# Patient Record
Sex: FEMALE | Race: WHITE | NOT HISPANIC OR LATINO | URBAN - METROPOLITAN AREA
[De-identification: names, ages, dates, MRNs, and addresses within clinical notes are randomized per-mention and may not be internally consistent; named-entity substitution may affect disease eponyms.]

---

## 2017-04-04 ENCOUNTER — ALLSCRIPTS OFFICE VISIT (OUTPATIENT)
Dept: OTHER | Facility: OTHER | Age: 47
End: 2017-04-04

## 2017-04-04 DIAGNOSIS — Z12.31 ENCOUNTER FOR SCREENING MAMMOGRAM FOR MALIGNANT NEOPLASM OF BREAST: ICD-10-CM

## 2018-01-12 VITALS
DIASTOLIC BLOOD PRESSURE: 64 MMHG | HEIGHT: 64 IN | SYSTOLIC BLOOD PRESSURE: 116 MMHG | BODY MASS INDEX: 25.44 KG/M2 | WEIGHT: 149 LBS

## 2022-04-14 ENCOUNTER — TELEPHONE (OUTPATIENT)
Dept: GASTROENTEROLOGY | Facility: CLINIC | Age: 52
End: 2022-04-14

## 2022-06-06 NOTE — H&P (VIEW-ONLY)
Assessment:  1  Chronic right-sided low back pain without sciatica    2  Chronic right sacroiliac pain        Plan:  Orders Placed This Encounter   Procedures    X-ray lumbar spine 2 or 3 views     Standing Status:   Future     Standing Expiration Date:   6/10/2026     Scheduling Instructions:      Bring along any outside films relating to this procedure  Order Specific Question:   Is the patient pregnant? Answer:   Unknown       New Medications Ordered This Visit   Medications    tiZANidine (ZANAFLEX) 4 mg tablet     Sig: Take 4 mg by mouth daily at bedtime    ELDERBERRY PO     Sig: Take by mouth       My impressions and treatment recommendations were discussed in detail with the patient, who verbalized understanding and had no further questions  This is a 60-year-old female who presents to the office she complained of right-sided low back pain radiating into the right buttock and right upper leg  She has positive facet loading to the right  Has also positive KRYSTIAN test on the right  Appears she may be suffering from axial low back pain secondary to facet and possibly right SI pain  We will order x-ray of lumbar spine to assess for a facet disease  She may be candidate for MBB/RFA and radiofrequency ablation which was thoroughly discussed with the patient today  We will determine next course of action based on x-ray  Future procedure considerations include SI joint injection  She also can utilize ibuprofen 600 mg t i d  p r n  with food and water  [place] Prescription Drug Monitoring Program report was reviewed and was appropriate     Complete risks and benefits including bleeding, infection, tissue reaction, nerve injury and allergic reaction were discussed  The approach was demonstrated using models and literature was provided  Verbal and written consent was obtained  Discharge instructions were provided   I personally saw and examined the patient and I agree with the above discussed plan of care  History of Present Illness:    Crispin Ambrose is a 46 y o  female who presents to [facility] for initial evaluation of the above stated pain complaints  The patient has a past medical and chronic pain history as outlined in the assessment section  Patient has chief complaint of low back and right hip pain  This is chronic issue for last 5 years  Undetermined cause  Moderate-to-severe intensity over the past month  Eight hundred ten and notable with sitting  Constant, worse in the evening  Described as shooting, sharp, pressure-like, throbbing in nature  Assistive device  Increased with lying down, bending, sitting  No change with standing, walking, exercise, relaxation, coughing, sneezing  No relief with PT primarily with heat/ice therapy and chiropractic manipulation  Pain is primarily right side low back and into the right buttock  Reports having MRI  but doesn't appear to be at [facility]  Reports being told she was "bone on bone" in the lumabr spine       Review of Systems:    Review of Systems      There is no problem list on file for this patient  Past Medical History:   Diagnosis Date    Allergic rhinitis     Complete spontaneous      RESOLVED:     Depression     Insomnia     Postpartum depression        History reviewed  No pertinent surgical history      Family History   Problem Relation Age of Onset    Diabetes Mother     Heart disease Mother     Cervical cancer Maternal Grandmother     Ovarian cancer Maternal Grandmother     Asthma Maternal Grandfather     Diabetes Maternal Grandfather     Heart disease Maternal Grandfather     Heart disease Paternal Grandmother     Insomnia Family     Ovarian cancer Maternal Aunt        Social History     Occupational History    Not on file   Tobacco Use    Smoking status: Never Smoker    Smokeless tobacco: Never Used   Vaping Use    Vaping Use: Never used Substance and Sexual Activity    Alcohol use: Yes     Comment: SOCIAL    Drug use: Never    Sexual activity: Not on file     Comment: ORAL CONTRACEPTIVE USE         Current Outpatient Medications:     ELDERBERRY PO, Take by mouth, Disp: , Rfl:     Multiple Vitamin (MULTI-VITAMIN DAILY PO), Take 1 tablet by mouth daily, Disp: , Rfl:     tiZANidine (ZANAFLEX) 4 mg tablet, Take 4 mg by mouth daily at bedtime, Disp: , Rfl:     ALPRAZolam (XANAX) 0 25 mg tablet, Take 0 25 mg by mouth 3 (three) times a day (Patient not taking: Reported on 6/10/2022), Disp: , Rfl: 1    omeprazole (PriLOSEC) 40 MG capsule, Take 1 tablet by mouth daily (Patient not taking: Reported on 6/10/2022), Disp: , Rfl: 2    Allergies   Allergen Reactions    Other      Environmental    Penicillins Rash       Physical Exam:    /69 (BP Location: Right arm, Patient Position: Sitting, Cuff Size: Standard)   Pulse 71   Wt 71 8 kg (158 lb 6 4 oz)   BMI 27 19 kg/m²     Constitutional: normal, well developed, well nourished, alert, in no distress and non-toxic and no overt pain behavior    Eyes: anicteric  HEENT: grossly intact  Neck: supple, symmetric, trachea midline and no masses   Pulmonary:even and unlabored  Cardiovascular:No edema or pitting edema present  Skin:Normal without rashes or lesions and well hydrated  Psychiatric:Mood and affect appropriate  Neurologic:Cranial Nerves II-XII grossly intact  Musculoskeletal:normal     Lumbar Spine Exam    Appearance:  Normal lordosis  Palpation/Tenderness:  right lumbar paraspinal tenderness  right sacroiliac joint tenderness  Sensory:  no sensory deficits noted  Range of Motion:  Flexion:  Minimally limited  with pain  Extension:  Moderately limited  with pain  Lateral Flexion - Left:  No limitation  without pain  Lateral Flexion - Right:  Minimally limited  with pain  Rotation - Left:  No limitation  without pain  Rotation - Right:  No limitation  without pain  Motor Strength:  Left hip flexion:  5/5  Left hip extension:  5/5  Right hip flexion:  5/5  Right hip extension:  5/5  Left knee flexion:  5/5  Left knee extension:  5/5  Right knee flexion:  5/5  Right knee extension:  5/5  Left foot dorsiflexion:  5/5  Left foot plantar flexion:  5/5  Right foot dorsiflexion:  5/5  Right foot plantar flexion:  5/5  Reflexes:  Left Patellar:  2+   Right Patellar:  2+   Left Achilles:  2+   Right Achilles:  2+   Special Tests:  Left Straight Leg Test:  negative  Right Straight Leg Test:  negative  Left John's Maneuver:  negative  Right John's Maneuver:  positive  Left Gaenslen's Test:  negative  Right Gaenslen's Test:  positive      Lumbar facet loading positive right      Imaging  X-ray lumbar spine 2 or 3 views    (Results Pending)       Orders Placed This Encounter   Procedures    X-ray lumbar spine 2 or 3 views

## 2022-06-06 NOTE — PROGRESS NOTES
Assessment:  1  Chronic right-sided low back pain without sciatica    2  Chronic right sacroiliac pain        Plan:  Orders Placed This Encounter   Procedures    X-ray lumbar spine 2 or 3 views     Standing Status:   Future     Standing Expiration Date:   6/10/2026     Scheduling Instructions:      Bring along any outside films relating to this procedure  Order Specific Question:   Is the patient pregnant? Answer:   Unknown       New Medications Ordered This Visit   Medications    tiZANidine (ZANAFLEX) 4 mg tablet     Sig: Take 4 mg by mouth daily at bedtime    ELDERBERRY PO     Sig: Take by mouth       My impressions and treatment recommendations were discussed in detail with the patient, who verbalized understanding and had no further questions  This is a 51-year-old female who presents to the office she complained of right-sided low back pain radiating into the right buttock and right upper leg  She has positive facet loading to the right  Has also positive KRYSTIAN test on the right  Appears she may be suffering from axial low back pain secondary to facet and possibly right SI pain  We will order x-ray of lumbar spine to assess for a facet disease  She may be candidate for MBB/RFA and radiofrequency ablation which was thoroughly discussed with the patient today  We will determine next course of action based on x-ray  Future procedure considerations include SI joint injection  She also can utilize ibuprofen 600 mg t i d  p r n  with food and water  [place] Prescription Drug Monitoring Program report was reviewed and was appropriate     Complete risks and benefits including bleeding, infection, tissue reaction, nerve injury and allergic reaction were discussed  The approach was demonstrated using models and literature was provided  Verbal and written consent was obtained  Discharge instructions were provided   I personally saw and examined the patient and I agree with the above discussed plan of care  History of Present Illness:    Nghia North is a 46 y o  female who presents to [facility] for initial evaluation of the above stated pain complaints  The patient has a past medical and chronic pain history as outlined in the assessment section  Patient has chief complaint of low back and right hip pain  This is chronic issue for last 5 years  Undetermined cause  Moderate-to-severe intensity over the past month  Eight hundred ten and notable with sitting  Constant, worse in the evening  Described as shooting, sharp, pressure-like, throbbing in nature  Assistive device  Increased with lying down, bending, sitting  No change with standing, walking, exercise, relaxation, coughing, sneezing  No relief with PT primarily with heat/ice therapy and chiropractic manipulation  Pain is primarily right side low back and into the right buttock  Reports having MRI  but doesn't appear to be at [facility]  Reports being told she was "bone on bone" in the lumabr spine       Review of Systems:    Review of Systems      There is no problem list on file for this patient  Past Medical History:   Diagnosis Date    Allergic rhinitis     Complete spontaneous      RESOLVED:     Depression     Insomnia     Postpartum depression        History reviewed  No pertinent surgical history      Family History   Problem Relation Age of Onset    Diabetes Mother     Heart disease Mother     Cervical cancer Maternal Grandmother     Ovarian cancer Maternal Grandmother     Asthma Maternal Grandfather     Diabetes Maternal Grandfather     Heart disease Maternal Grandfather     Heart disease Paternal Grandmother     Insomnia Family     Ovarian cancer Maternal Aunt        Social History     Occupational History    Not on file   Tobacco Use    Smoking status: Never Smoker    Smokeless tobacco: Never Used   Vaping Use    Vaping Use: Never used Substance and Sexual Activity    Alcohol use: Yes     Comment: SOCIAL    Drug use: Never    Sexual activity: Not on file     Comment: ORAL CONTRACEPTIVE USE         Current Outpatient Medications:     ELDERBERRY PO, Take by mouth, Disp: , Rfl:     Multiple Vitamin (MULTI-VITAMIN DAILY PO), Take 1 tablet by mouth daily, Disp: , Rfl:     tiZANidine (ZANAFLEX) 4 mg tablet, Take 4 mg by mouth daily at bedtime, Disp: , Rfl:     ALPRAZolam (XANAX) 0 25 mg tablet, Take 0 25 mg by mouth 3 (three) times a day (Patient not taking: Reported on 6/10/2022), Disp: , Rfl: 1    omeprazole (PriLOSEC) 40 MG capsule, Take 1 tablet by mouth daily (Patient not taking: Reported on 6/10/2022), Disp: , Rfl: 2    Allergies   Allergen Reactions    Other      Environmental    Penicillins Rash       Physical Exam:    /69 (BP Location: Right arm, Patient Position: Sitting, Cuff Size: Standard)   Pulse 71   Wt 71 8 kg (158 lb 6 4 oz)   BMI 27 19 kg/m²     Constitutional: normal, well developed, well nourished, alert, in no distress and non-toxic and no overt pain behavior    Eyes: anicteric  HEENT: grossly intact  Neck: supple, symmetric, trachea midline and no masses   Pulmonary:even and unlabored  Cardiovascular:No edema or pitting edema present  Skin:Normal without rashes or lesions and well hydrated  Psychiatric:Mood and affect appropriate  Neurologic:Cranial Nerves II-XII grossly intact  Musculoskeletal:normal     Lumbar Spine Exam    Appearance:  Normal lordosis  Palpation/Tenderness:  right lumbar paraspinal tenderness  right sacroiliac joint tenderness  Sensory:  no sensory deficits noted  Range of Motion:  Flexion:  Minimally limited  with pain  Extension:  Moderately limited  with pain  Lateral Flexion - Left:  No limitation  without pain  Lateral Flexion - Right:  Minimally limited  with pain  Rotation - Left:  No limitation  without pain  Rotation - Right:  No limitation  without pain  Motor Strength:  Left hip flexion:  5/5  Left hip extension:  5/5  Right hip flexion:  5/5  Right hip extension:  5/5  Left knee flexion:  5/5  Left knee extension:  5/5  Right knee flexion:  5/5  Right knee extension:  5/5  Left foot dorsiflexion:  5/5  Left foot plantar flexion:  5/5  Right foot dorsiflexion:  5/5  Right foot plantar flexion:  5/5  Reflexes:  Left Patellar:  2+   Right Patellar:  2+   Left Achilles:  2+   Right Achilles:  2+   Special Tests:  Left Straight Leg Test:  negative  Right Straight Leg Test:  negative  Left John's Maneuver:  negative  Right John's Maneuver:  positive  Left Gaenslen's Test:  negative  Right Gaenslen's Test:  positive      Lumbar facet loading positive right      Imaging  X-ray lumbar spine 2 or 3 views    (Results Pending)       Orders Placed This Encounter   Procedures    X-ray lumbar spine 2 or 3 views

## 2022-06-10 ENCOUNTER — APPOINTMENT (OUTPATIENT)
Dept: RADIOLOGY | Facility: CLINIC | Age: 52
End: 2022-06-10
Payer: COMMERCIAL

## 2022-06-10 ENCOUNTER — CONSULT (OUTPATIENT)
Dept: PAIN MEDICINE | Facility: CLINIC | Age: 52
End: 2022-06-10
Payer: COMMERCIAL

## 2022-06-10 VITALS
HEART RATE: 71 BPM | WEIGHT: 158.4 LBS | DIASTOLIC BLOOD PRESSURE: 69 MMHG | SYSTOLIC BLOOD PRESSURE: 102 MMHG | BODY MASS INDEX: 27.19 KG/M2

## 2022-06-10 DIAGNOSIS — G89.29 CHRONIC RIGHT-SIDED LOW BACK PAIN WITHOUT SCIATICA: ICD-10-CM

## 2022-06-10 DIAGNOSIS — M54.50 CHRONIC RIGHT-SIDED LOW BACK PAIN WITHOUT SCIATICA: Primary | ICD-10-CM

## 2022-06-10 DIAGNOSIS — M54.50 CHRONIC RIGHT-SIDED LOW BACK PAIN WITHOUT SCIATICA: ICD-10-CM

## 2022-06-10 DIAGNOSIS — G89.29 CHRONIC RIGHT-SIDED LOW BACK PAIN WITHOUT SCIATICA: Primary | ICD-10-CM

## 2022-06-10 DIAGNOSIS — M53.3 CHRONIC RIGHT SACROILIAC PAIN: ICD-10-CM

## 2022-06-10 DIAGNOSIS — G89.29 CHRONIC RIGHT SACROILIAC PAIN: ICD-10-CM

## 2022-06-10 PROCEDURE — 99204 OFFICE O/P NEW MOD 45 MIN: CPT | Performed by: STUDENT IN AN ORGANIZED HEALTH CARE EDUCATION/TRAINING PROGRAM

## 2022-06-10 PROCEDURE — 72100 X-RAY EXAM L-S SPINE 2/3 VWS: CPT

## 2022-06-10 RX ORDER — TIZANIDINE 4 MG/1
4 TABLET ORAL
COMMUNITY
Start: 2022-04-14

## 2022-06-10 RX ORDER — DIPHENOXYLATE HYDROCHLORIDE AND ATROPINE SULFATE 2.5; .025 MG/1; MG/1
1 TABLET ORAL
COMMUNITY
End: 2022-06-10 | Stop reason: SDUPTHER

## 2022-06-10 NOTE — PATIENT INSTRUCTIONS
Lumbar Facet Block   WHAT YOU NEED TO KNOW:   What do I need to know about a lumbar facet block? A lumbar facet block is a procedure used to decrease inflammation in your lower spine  Medicines are injected at facet joints in your lower back  Facet joints are found at the back of each vertebrae  How do I prepare for the procedure? Your healthcare provider will talk to you about how to prepare for your procedure  He or she may tell you not to eat or drink anything after midnight on the day of your procedure  Arrange to have someone drive you home after the procedure  Tell your provider about all the medicines you currently take  He or she will tell you if you need to stop any medicine before the procedure, and when to stop  He or she will tell you what medicines to take or not take on the day of your procedure  Your provider will also talk to you about your regular pain medicines  He or she may want you to wait a certain amount of time after the procedure before you start them again  This will help him or her see if the facet block worked for you  You may need blood or urine tests before your procedure  You may also need x-rays, a CT scan, or an MRI  Tell your healthcare provider if you have ever had an allergic reaction to contrast liquid  Do not enter the MRI room with anything metal  Metal can cause serious damage  Tell the provider if you have any metal in or on your body  What will happen during the procedure? You will lie on your stomach, with your body slightly turned to the side  A pillow may be placed under your abdomen, or you may be asked to bend one or both knees  A needle will be inserted into the facet joint in your lower back  Your surgeon may use contrast liquid with an x-ray or CT to help guide the needle  He or she will inject medicines, such as steroids, to decrease inflammation  What should I expect after the procedure?   You will be taken to a room to rest until you are fully awake  You will be monitored closely for any problems  Do not get out of bed until your healthcare provider says it is okay  Your provider may have you move the area to see if you still have pain  You may then be able to go home  What are the risks of a lumbar facet block? You may bleed more than expected or get an infection  Nerves, blood vessels, or muscles may be damaged  You may have numbness in other areas  You may still have lower back or leg pain  CARE AGREEMENT:   You have the right to help plan your care  Learn about your health condition and how it may be treated  Discuss treatment options with your healthcare providers to decide what care you want to receive  You always have the right to refuse treatment  The above information is an  only  It is not intended as medical advice for individual conditions or treatments  Talk to your doctor, nurse or pharmacist before following any medical regimen to see if it is safe and effective for you  © Copyright [employer] 2022 Information is for End User's use only and may not be sold, redistributed or otherwise used for commercial purposes   All illustrations and images included in CareNotes® are the copyrighted property of A D A M , Inc  or [address] [place] [address] [employer]

## 2022-06-17 ENCOUNTER — OFFICE VISIT (OUTPATIENT)
Dept: OBGYN CLINIC | Facility: MEDICAL CENTER | Age: 52
End: 2022-06-17
Payer: COMMERCIAL

## 2022-06-17 VITALS
DIASTOLIC BLOOD PRESSURE: 86 MMHG | SYSTOLIC BLOOD PRESSURE: 134 MMHG | WEIGHT: 158.6 LBS | BODY MASS INDEX: 27.08 KG/M2 | HEIGHT: 64 IN

## 2022-06-17 DIAGNOSIS — Z12.11 ENCOUNTER FOR SCREENING FOR MALIGNANT NEOPLASM OF COLON: ICD-10-CM

## 2022-06-17 DIAGNOSIS — Z01.419 ENCOUNTER FOR ANNUAL ROUTINE GYNECOLOGICAL EXAMINATION: Primary | ICD-10-CM

## 2022-06-17 DIAGNOSIS — Z12.31 ENCOUNTER FOR SCREENING MAMMOGRAM FOR MALIGNANT NEOPLASM OF BREAST: ICD-10-CM

## 2022-06-17 DIAGNOSIS — Z80.41 FAMILY HISTORY OF OVARIAN CANCER: ICD-10-CM

## 2022-06-17 DIAGNOSIS — Z11.51 SCREENING FOR HUMAN PAPILLOMAVIRUS (HPV): ICD-10-CM

## 2022-06-17 PROCEDURE — G0145 SCR C/V CYTO,THINLAYER,RESCR: HCPCS | Performed by: OBSTETRICS & GYNECOLOGY

## 2022-06-17 PROCEDURE — 99386 PREV VISIT NEW AGE 40-64: CPT | Performed by: OBSTETRICS & GYNECOLOGY

## 2022-06-17 PROCEDURE — G0476 HPV COMBO ASSAY CA SCREEN: HCPCS | Performed by: OBSTETRICS & GYNECOLOGY

## 2022-06-17 NOTE — PROGRESS NOTES
Hood Gerber  1970      CC:  Yearly exam    S:  46 y o  female here for yearly exam   Cycles are irregular  , then 2022 (this was an 8d long bleed), none since then  Perimenopausal   Hot flashes x 1 year - worse in the evenings  She denies vaginal discharge, itching, pelvic pain  She has no urinary concerns, does have occasional incontinence, small volumes  Chronic issues with constipation  No breast concerns  Sexual activity: She is sexually active without pain, bleeding or dryness  Last Pap: 2/17/15 - NILM, neg HPV  Last Mammo:  12/3/15 - negative   GI referral provided     We reviewed ASCCP guidelines for Pap testing today       Family hx of breast cancer: no  Family hx of ovarian cancer: MGM, M Aunt - Stage IV, living  Family hx of colon cancer: no      Current Outpatient Medications:     Ascorbic Acid (VITAMIN C PO), Take by mouth, Disp: , Rfl:     Calcium Carbonate-Vitamin D (CALCIUM-D PO), Take by mouth, Disp: , Rfl:     ELDERBERRY PO, Take by mouth, Disp: , Rfl:     Multiple Vitamin (MULTI-VITAMIN DAILY PO), Take 1 tablet by mouth daily, Disp: , Rfl:     Omega-3 Fatty Acids (OMEGA-3 FISH OIL PO), Take by mouth, Disp: , Rfl:     VITAMIN D PO, Take by mouth, Disp: , Rfl:     tiZANidine (ZANAFLEX) 4 mg tablet, Take 4 mg by mouth daily at bedtime (Patient not taking: Reported on 2022), Disp: , Rfl:   Patient Active Problem List   Diagnosis    Anxiety     Past Medical History:   Diagnosis Date    Allergic rhinitis     Complete spontaneous      RESOLVED:     Depression     Insomnia     Postpartum depression      Family History   Problem Relation Age of Onset    Diabetes Mother     Heart disease Mother     Cervical cancer Maternal Grandmother     Ovarian cancer Maternal Grandmother     Asthma Maternal Grandfather     Diabetes Maternal Grandfather     Heart disease Maternal Grandfather     Heart disease Paternal Grandmother     Insomnia Family  Ovarian cancer Maternal Aunt           Review of Systems   Respiratory: Negative  Cardiovascular: Negative  Gastrointestinal: Negative for constipation and diarrhea  O:  Blood pressure 134/86, height 5' 3 5" (1 613 m), weight 71 9 kg (158 lb 9 6 oz), last menstrual period 04/19/2022  Patient appears well and is not in distress  Breasts are symmetrical without mass, tenderness, nipple discharge, skin changes or adenopathy  Abdomen is soft and nontender without masses  External genitals are normal without lesions or rashes  Urethral meatus and urethra are normal  Bladder is normal to palpation  Vagina is normal without discharge or bleeding  Cervix is normal without discharge or lesion  Uterus is normal, mobile, nontender without palpable mass  Adnexa are normal, nontender, without palpable mass  A:  Yearly exam      P:   Pap & HPV collected   Mammo ordered   Colon Cancer Screening ordered   DIscussed options for genetic screening in light of family history, referral placed for her consideration  Discussed perimenopausal considerations  Aware to call with frequent heavy bleeding, or new onset/return of bleeding after >12 months   RTO one year for yearly exam or sooner as needed

## 2022-06-20 LAB
HPV HR 12 DNA CVX QL NAA+PROBE: NEGATIVE
HPV16 DNA CVX QL NAA+PROBE: NEGATIVE
HPV18 DNA CVX QL NAA+PROBE: NEGATIVE

## 2022-06-22 ENCOUNTER — TELEPHONE (OUTPATIENT)
Dept: PAIN MEDICINE | Facility: MEDICAL CENTER | Age: 52
End: 2022-06-22

## 2022-06-22 NOTE — TELEPHONE ENCOUNTER
Pt called stating she completed her xrays and would like to know  He next steps  Pt can be reached at [phone]

## 2022-06-23 ENCOUNTER — TELEPHONE (OUTPATIENT)
Dept: GENETICS | Facility: CLINIC | Age: 52
End: 2022-06-23

## 2022-06-23 DIAGNOSIS — M47.816 LUMBAR SPONDYLOSIS: Primary | ICD-10-CM

## 2022-06-23 NOTE — TELEPHONE ENCOUNTER
S/W pt and advised of results and plan  Pt's pain is on the right side LB and around hip area  Explained process of MBB/RFA  Pt is agreeable to injections    Please place order    Pt is aware that approval for insurance needed prior to scheduling

## 2022-06-23 NOTE — TELEPHONE ENCOUNTER
I called Nicholas Palm to schedule a new patient appointment with the [facility]       Outcome:   Spoke with pt, appt schedule for feb 22 at 2:30  Fhx of ovarian cancer in MGM and M aunt, Bone Ca - M  Uncle   No prior genetic testing done

## 2022-06-23 NOTE — TELEPHONE ENCOUNTER
Chloe Pabon MD  [facility]  xRAY shows some degenerative disc disease in the lumbar spine where there is narrowing in the disc spaces  The is no fracture or misalignment   She is cadndiate for right L4-5 and L5-S1 mbb #1 for diagnostic purposes, assuming pain is still predominantly right sided

## 2022-06-24 LAB
LAB AP GYN PRIMARY INTERPRETATION: NORMAL
LAB AP LMP: NORMAL
Lab: NORMAL

## 2022-06-27 NOTE — TELEPHONE ENCOUNTER
S/w pt and scheduled MBB for 7/5/22 315 pm arrival  Gave pre procedure instructions and  policy, [facility]

## 2022-07-05 ENCOUNTER — HOSPITAL ENCOUNTER (OUTPATIENT)
Dept: RADIOLOGY | Facility: CLINIC | Age: 52
Discharge: HOME | End: 2022-07-05
Admitting: STUDENT IN AN ORGANIZED HEALTH CARE EDUCATION/TRAINING PROGRAM
Payer: COMMERCIAL

## 2022-07-05 VITALS
OXYGEN SATURATION: 97 % | HEART RATE: 68 BPM | TEMPERATURE: 97.3 F | DIASTOLIC BLOOD PRESSURE: 62 MMHG | SYSTOLIC BLOOD PRESSURE: 102 MMHG | RESPIRATION RATE: 18 BRPM

## 2022-07-05 DIAGNOSIS — M47.816 LUMBAR SPONDYLOSIS: ICD-10-CM

## 2022-07-05 PROCEDURE — 64493 INJ PARAVERT F JNT L/S 1 LEV: CPT | Performed by: STUDENT IN AN ORGANIZED HEALTH CARE EDUCATION/TRAINING PROGRAM

## 2022-07-05 PROCEDURE — 64494 INJ PARAVERT F JNT L/S 2 LEV: CPT | Performed by: STUDENT IN AN ORGANIZED HEALTH CARE EDUCATION/TRAINING PROGRAM

## 2022-07-05 RX ORDER — BUPIVACAINE HCL/PF 2.5 MG/ML
1.5 VIAL (ML) INJECTION ONCE
Status: COMPLETED | OUTPATIENT
Start: 2022-07-05 | End: 2022-07-05

## 2022-07-05 RX ADMIN — Medication 1.5 ML: at 15:40

## 2022-07-05 NOTE — INTERVAL H&P NOTE
Update: (This section must be completed if the H&P was completed greater than 24 hrs to procedure or admission)    H&P reviewed  After examining the patient, I find no changed to the H&P since it had been written  Patient re-evaluated   Accept as history and physical     Nathen Bethea MD/July 5, 2022/3:28 PM

## 2022-07-05 NOTE — DISCHARGE INSTR - LAB

## 2022-07-06 ENCOUNTER — PATIENT MESSAGE (OUTPATIENT)
Dept: PAIN MEDICINE | Facility: CLINIC | Age: 52
End: 2022-07-06

## 2022-07-07 ENCOUNTER — TELEPHONE (OUTPATIENT)
Dept: RADIOLOGY | Facility: CLINIC | Age: 52
End: 2022-07-07

## 2022-07-07 NOTE — TELEPHONE ENCOUNTER
S/p R L4-5 and L5-S1 MBB #1 7/5  Pt reports % relief til the following morning    Per SP protocol, please schedule MBB #2

## 2022-07-07 NOTE — TELEPHONE ENCOUNTER
----- Message from Yumiko Bartlett sent at 7/6/2022  9:17 PM EDT -----  Regarding: MBB diary  Form attached

## 2022-07-21 ENCOUNTER — TELEPHONE (OUTPATIENT)
Dept: PAIN MEDICINE | Facility: CLINIC | Age: 52
End: 2022-07-21

## 2022-07-21 DIAGNOSIS — M47.816 LUMBAR FACET ARTHROPATHY: Primary | ICD-10-CM

## 2022-07-21 NOTE — TELEPHONE ENCOUNTER
Schedule patient for 8/2/22  No as needed pain meds for 6 hrs before and 6/8 hrs after procedure  Pain level must be 5 or greater  Need to arrange transportation  Proper clothing for procedure  If ill or placed on antibiotics please call to reschedule

## 2022-07-21 NOTE — TELEPHONE ENCOUNTER
----- Message from Briseida Acosta RN sent at 7/21/2022  1:09 PM EDT -----  Regarding: FW: MBB diary  John Mazariegos would you be able to look into auth for this procedure?    ----- Message -----  From: Daphne Srinivasan MD  Sent: 7/21/2022  12:51 PM EDT  To: [facility]  Subject: FW: MBB diary                                    Order placed  ----- Message -----  From: Tona Ontiveros RN  Sent: 7/19/2022  10:40 AM EDT  To: Daphne Srinivasan MD  Subject: FW: MBB diary                                    Please place order for MBB#2   ----- Message -----  From: Brenda Durán  Sent: 7/19/2022   9:48 AM EDT  To: [facility]  Subject: MBB diary                                        Hello, I was just checking to see if you heard back from my insurance  My pain has returned       Sincerely,  Rubia

## 2022-07-21 NOTE — PATIENT COMMUNICATION
Left message for the patient to call to schedule her injection    Gave my direct phone number [phone]  Alexi Shy is pending UXJ#[id number], Costa reaves no PA required [id number]

## 2022-07-28 ENCOUNTER — HOSPITAL ENCOUNTER (OUTPATIENT)
Dept: MAMMOGRAPHY | Facility: CLINIC | Age: 52
Discharge: HOME | End: 2022-07-28
Payer: COMMERCIAL

## 2022-07-28 DIAGNOSIS — Z12.31 ENCOUNTER FOR SCREENING MAMMOGRAM FOR MALIGNANT NEOPLASM OF BREAST: ICD-10-CM

## 2022-07-28 PROCEDURE — 77067 SCR MAMMO BI INCL CAD: CPT

## 2022-07-28 PROCEDURE — 77063 BREAST TOMOSYNTHESIS BI: CPT

## 2022-08-02 ENCOUNTER — HOSPITAL ENCOUNTER (OUTPATIENT)
Dept: RADIOLOGY | Facility: CLINIC | Age: 52
Discharge: HOME | End: 2022-08-02
Admitting: STUDENT IN AN ORGANIZED HEALTH CARE EDUCATION/TRAINING PROGRAM
Payer: COMMERCIAL

## 2022-08-02 VITALS
SYSTOLIC BLOOD PRESSURE: 110 MMHG | OXYGEN SATURATION: 98 % | DIASTOLIC BLOOD PRESSURE: 66 MMHG | RESPIRATION RATE: 17 BRPM | TEMPERATURE: 98.6 F | HEART RATE: 69 BPM

## 2022-08-02 DIAGNOSIS — M47.816 LUMBAR FACET ARTHROPATHY: ICD-10-CM

## 2022-08-02 PROCEDURE — 64494 INJ PARAVERT F JNT L/S 2 LEV: CPT | Performed by: STUDENT IN AN ORGANIZED HEALTH CARE EDUCATION/TRAINING PROGRAM

## 2022-08-02 PROCEDURE — 64493 INJ PARAVERT F JNT L/S 1 LEV: CPT | Performed by: STUDENT IN AN ORGANIZED HEALTH CARE EDUCATION/TRAINING PROGRAM

## 2022-08-02 RX ORDER — BUPIVACAINE HYDROCHLORIDE 7.5 MG/ML
3 INJECTION, SOLUTION EPIDURAL; RETROBULBAR ONCE
Status: COMPLETED | OUTPATIENT
Start: 2022-08-02 | End: 2022-08-02

## 2022-08-02 RX ADMIN — BUPIVACAINE HYDROCHLORIDE 1.5 ML: 7.5 INJECTION, SOLUTION EPIDURAL; RETROBULBAR at 15:32

## 2022-08-02 NOTE — INTERVAL H&P NOTE
Update: (This section must be completed if the H&P was completed greater than 24 hrs to procedure or admission)    H&P updated  The patient was examined and patient was noted to have pain only on the right  Therefore procedure performed only on right side  Patient re-evaluated   Accept as history and physical     Charlotte Quijano MD/August 2, 2022/3:19 PM

## 2022-08-02 NOTE — H&P
History of Present Illness: The patient is a 46 y o  female who presents with complaints of bilateral low back pain    Patient Active Problem List   Diagnosis    Anxiety       Past Medical History:   Diagnosis Date    Allergic rhinitis     Complete spontaneous      RESOLVED:     Depression     Insomnia     Postpartum depression        No past surgical history on file  Current Outpatient Medications:     Ascorbic Acid (VITAMIN C PO), Take by mouth, Disp: , Rfl:     Calcium Carbonate-Vitamin D (CALCIUM-D PO), Take by mouth, Disp: , Rfl:     ELDERBERRY PO, Take by mouth, Disp: , Rfl:     Multiple Vitamin (MULTI-VITAMIN DAILY PO), Take 1 tablet by mouth daily, Disp: , Rfl:     Omega-3 Fatty Acids (OMEGA-3 FISH OIL PO), Take by mouth, Disp: , Rfl:     tiZANidine (ZANAFLEX) 4 mg tablet, Take 4 mg by mouth daily at bedtime (Patient not taking: Reported on 2022), Disp: , Rfl:     VITAMIN D PO, Take by mouth, Disp: , Rfl:     Allergies   Allergen Reactions    Other      Environmental    Penicillins Rash       Physical Exam: There were no vitals filed for this visit  General: Awake, Alert, Oriented x 3, Mood and affect appropriate  Respiratory: Respirations even and unlabored  Cardiovascular: Peripheral pulses intact; no edema  Musculoskeletal Exam: low back pain    ASA Score: 3         Assessment:   1   Lumbar facet arthropathy        Plan: B/L L4-5 and L5-S1 MBB #2

## 2022-08-02 NOTE — DISCHARGE INSTR - LAB

## 2022-08-17 DIAGNOSIS — M47.816 LUMBAR FACET ARTHROPATHY: Primary | ICD-10-CM

## 2022-08-24 ENCOUNTER — TELEPHONE (OUTPATIENT)
Dept: PAIN MEDICINE | Facility: CLINIC | Age: 52
End: 2022-08-24

## 2022-08-24 NOTE — TELEPHONE ENCOUNTER
----- Message from Lelia Logan RN sent at 8/15/2022  1:59 PM EDT -----  Regarding: FW: Pain Diary from 8/2/22    ----- Message -----  From: Morgan Shell MD  Sent: 8/15/2022   1:56 PM EDT  To: [facility]  Subject: FW: Pain Diary from 8/2/22                       Ok to schedule  ----- Message -----  From: Maynor Velazquez RN  Sent: 8/11/2022   2:11 PM EDT  To: Morgan Shell MD  Subject: FW: Pain Diary from 8/2/22                       Please see pain diary and advise  Ok to schedule RFA?  ----- Message -----  From: Coco Nye  Sent: 8/11/2022   1:47 PM EDT  To: [facility]  Subject: Pain Diary from 8/2/22                           Hello, my apologizes  I forgot to send

## 2022-08-24 NOTE — TELEPHONE ENCOUNTER
Spoke with Corey Valle to schedule the RFAs  She seem unaware that there were more procedures to complete  After explaining the RFAs, the procedure scared her  Please call to explain and discuss the RFA procedure  They have been approved by her insurance, so they can be scheduled

## 2022-08-24 NOTE — TELEPHONE ENCOUNTER
FYI:  S/W pt and explained procedure, risks, side effects, etc   Also discussed she may need medication to calm her nerves, which could help    Pt states she will think about it and CB to schedule

## 2022-08-25 NOTE — TELEPHONE ENCOUNTER
Spoke with Kaye Lui- she will call the office when she is ready to schedule    Scanned the PAT and auth into her chart

## 2022-08-29 DIAGNOSIS — M54.9 MID BACK PAIN: ICD-10-CM

## 2022-08-29 DIAGNOSIS — M47.816 LUMBAR FACET ARTHROPATHY: Primary | ICD-10-CM

## 2022-08-29 DIAGNOSIS — F41.9 ANXIETY DUE TO INVASIVE PROCEDURE: ICD-10-CM

## 2022-08-29 RX ORDER — LORAZEPAM 1 MG/1
TABLET ORAL
Qty: 1 TABLET | Refills: 0 | Status: SHIPPED | OUTPATIENT
Start: 2022-09-08 | End: 2023-02-10

## 2022-08-29 NOTE — TELEPHONE ENCOUNTER
Schedule patient for Right RFA on 9/8/22  She stated that the last MBB procedure was only done on her right side      Need to arrange transportation  Proper clothing for procedure  If ill or placed on antibiotics please call to reschedule    Please call in an anxiety medication for prior to the RFA

## 2022-09-08 ENCOUNTER — HOSPITAL ENCOUNTER (OUTPATIENT)
Dept: RADIOLOGY | Facility: CLINIC | Age: 52
End: 2022-09-08
Payer: COMMERCIAL

## 2022-09-08 VITALS
SYSTOLIC BLOOD PRESSURE: 122 MMHG | DIASTOLIC BLOOD PRESSURE: 71 MMHG | OXYGEN SATURATION: 96 % | RESPIRATION RATE: 18 BRPM | HEART RATE: 70 BPM | TEMPERATURE: 97.6 F

## 2022-09-08 DIAGNOSIS — M47.816 LUMBAR FACET ARTHROPATHY: ICD-10-CM

## 2022-09-08 PROCEDURE — 64636 DESTROY L/S FACET JNT ADDL: CPT | Performed by: STUDENT IN AN ORGANIZED HEALTH CARE EDUCATION/TRAINING PROGRAM

## 2022-09-08 PROCEDURE — 64635 DESTROY LUMB/SAC FACET JNT: CPT | Performed by: STUDENT IN AN ORGANIZED HEALTH CARE EDUCATION/TRAINING PROGRAM

## 2022-09-08 RX ADMIN — Medication 3 ML: at 13:25

## 2022-09-08 NOTE — H&P
History of Present Illness: The patient is a 46 y o  female who presents with complaints of right-sided low back pain    Patient Active Problem List   Diagnosis    Anxiety       Past Medical History:   Diagnosis Date    Allergic rhinitis     Complete spontaneous      RESOLVED:     Depression     Insomnia     Postpartum depression        No past surgical history on file  Current Outpatient Medications:     Ascorbic Acid (VITAMIN C PO), Take by mouth, Disp: , Rfl:     Calcium Carbonate-Vitamin D (CALCIUM-D PO), Take by mouth, Disp: , Rfl:     ELDERBERRY PO, Take by mouth, Disp: , Rfl:     LORazepam (ATIVAN) 1 mg tablet, Take 1 tablet approximately 45 minutes prior to your procedure , Disp: 1 tablet, Rfl: 0    Multiple Vitamin (MULTI-VITAMIN DAILY PO), Take 1 tablet by mouth daily, Disp: , Rfl:     Omega-3 Fatty Acids (OMEGA-3 FISH OIL PO), Take by mouth, Disp: , Rfl:     tiZANidine (ZANAFLEX) 4 mg tablet, Take 4 mg by mouth daily at bedtime (Patient not taking: Reported on 2022), Disp: , Rfl:     VITAMIN D PO, Take by mouth, Disp: , Rfl:     Current Facility-Administered Medications:     lidocaine (PF) (XYLOCAINE-MPF) 2 % injection 3 mL, 3 mL, Other, Once, Gretta Mariscal MD    Allergies   Allergen Reactions    Other      Environmental    Penicillins Rash       Physical Exam: There were no vitals filed for this visit  General: Awake, Alert, Oriented x 3, Mood and affect appropriate  Respiratory: Respirations even and unlabored  Cardiovascular: Peripheral pulses intact; no edema  Musculoskeletal Exam:  Positive facet loading right    ASA Score: 3    Patient/Chart Verification  Patient ID Verified: Verbal  ID Band Applied: No  Consents Confirmed: To be obtained in the Pre-Procedure area  H&P( within 30 days) Verified: To be obtained in the Pre-Procedure area  Interval H&P(within 24 hr) Complete (required for Outpatients and Surgery Admit only):  To be obtained in the Pre-Procedure area  Allergies Reviewed: Yes  Anticoag/NSAID held?: NA  Currently on antibiotics?: No  Pregnancy denied?: NA    Assessment:   1   Lumbar facet arthropathy        Plan: right L4 L5 S1 RFA

## 2022-09-08 NOTE — DISCHARGE INSTR - LAB
Medial Branch Radiofrequency Ablation     WHAT YOU NEED TO KNOW:   Medial branch radiofrequency ablation (RFA) is a procedure used to treat facet joint pain in your neck, mid back, or lower back  Facet joints are found at the back of each vertebra  A needle electrode is used to send electrical currents to the nerves in your facet joint  The electrical currents create heat that damages the nerve so it cannot send pain signals  ACTIVITY  Do not drive or operate machinery today  No strenuous activity today - bending, lifting, etc   You may shower today, but do not sit in a tub of water  Resume normal activities tomorrow as tolerated  CARE OF THE INJECTION SITE  If you have soreness or pain, apply ice to the area today (20 minutes on/20 minutes off)  Starting tomorrow, you may use warm, moist heat or ice if needed  Notify the [facility] if you have any of the following: redness, drainage, swelling, or fever above 100°F     SPECIAL INSTRUCTIONS  Our office will call you tomorrow for a progress report and make an appointment for a follow up visit in 4 weeks  If you feel a sunburn-like sensation in the area of your procedure, call our office  MEDICATIONS  Continue to take all routine medications  Our office may have instructed you to hold some medications  As no general anesthesia was used in today's procedure, you should not experience any side effects related to anesthesia  If you have a problem specifically related to your procedure, please call our office at [phone]  Problems not related to your procedure should be directed to your primary care physician

## 2022-09-13 ENCOUNTER — TELEPHONE (OUTPATIENT)
Dept: RADIOLOGY | Facility: CLINIC | Age: 52
End: 2022-09-13

## 2022-09-13 NOTE — TELEPHONE ENCOUNTER
S/w pt, pt denies sunburn sensation and sx of infection, little sore but doing okay  Pls call pt to schedule f/u ov

## 2023-01-10 ENCOUNTER — DOCUMENTATION (OUTPATIENT)
Dept: GENETICS | Facility: CLINIC | Age: 53
End: 2023-01-10

## 2023-01-24 ENCOUNTER — HOSPITAL ENCOUNTER (OUTPATIENT)
Dept: ULTRASOUND IMAGING | Facility: HOSPITAL | Age: 53
Discharge: HOME | End: 2023-01-24

## 2023-01-24 DIAGNOSIS — R10.84 GENERALIZED ABDOMINAL PAIN: ICD-10-CM

## 2023-01-27 ENCOUNTER — APPOINTMENT (OUTPATIENT)
Dept: LAB | Facility: MEDICAL CENTER | Age: 53
End: 2023-01-27

## 2023-01-27 DIAGNOSIS — R10.84 ABDOMINAL PAIN, GENERALIZED: ICD-10-CM

## 2023-01-27 LAB
AMYLASE SERPL-CCNC: 35 IU/L (ref 25–115)
LIPASE SERPL-CCNC: 140 U/L (ref 73–393)

## 2023-02-10 ENCOUNTER — TELEPHONE (OUTPATIENT)
Dept: HEMATOLOGY ONCOLOGY | Facility: CLINIC | Age: 53
End: 2023-02-10

## 2023-02-10 ENCOUNTER — HOSPITAL ENCOUNTER (OUTPATIENT)
Dept: NUCLEAR MEDICINE | Facility: HOSPITAL | Age: 53
Discharge: HOME | End: 2023-02-10
Attending: INTERNAL MEDICINE

## 2023-02-10 DIAGNOSIS — R10.84 GENERALIZED ABDOMINAL PAIN: ICD-10-CM

## 2023-02-15 ENCOUNTER — OFFICE VISIT (OUTPATIENT)
Dept: GASTROENTEROLOGY | Facility: CLINIC | Age: 53
End: 2023-02-15

## 2023-02-15 VITALS
WEIGHT: 154 LBS | HEART RATE: 83 BPM | BODY MASS INDEX: 26.29 KG/M2 | DIASTOLIC BLOOD PRESSURE: 68 MMHG | OXYGEN SATURATION: 99 % | SYSTOLIC BLOOD PRESSURE: 108 MMHG | HEIGHT: 64 IN

## 2023-02-15 DIAGNOSIS — R14.0 BLOATING: ICD-10-CM

## 2023-02-15 DIAGNOSIS — R10.11 RIGHT UPPER QUADRANT ABDOMINAL PAIN: Primary | ICD-10-CM

## 2023-02-15 DIAGNOSIS — Z12.11 ENCOUNTER FOR SCREENING FOR MALIGNANT NEOPLASM OF COLON: ICD-10-CM

## 2023-02-15 DIAGNOSIS — K59.04 CHRONIC IDIOPATHIC CONSTIPATION: ICD-10-CM

## 2023-02-15 DIAGNOSIS — R14.2 BELCHING: ICD-10-CM

## 2023-02-15 RX ORDER — PANTOPRAZOLE SODIUM 40 MG/1
40 TABLET, DELAYED RELEASE ORAL DAILY
Qty: 30 TABLET | Refills: 3 | Status: SHIPPED | OUTPATIENT
Start: 2023-02-15

## 2023-02-15 NOTE — PATIENT INSTRUCTIONS
Scheduled date of EGD/colonoscopy (as of today):5/6/23  Physician performing EGD/colonoscopy:Adam  Location of EGD/colonoscopy:[facility]  Desired bowel prep reviewed with patient:Noah/Miralax  Instructions reviewed with patient by:Michael wilkins  Clearances:   none

## 2023-02-15 NOTE — PROGRESS NOTES
[facility] - Outpatient Consultation  Leann Santos 48 y o  female MRN: [id number]  Encounter: [id number]          ASSESSMENT AND PLAN:      1  Encounter for screening for malignant neoplasm of colon  This will be her first colonoscopy  Average risk  2  Right upper quadrant abdominal pain  3  Belching  HIDA shows EF of 19%  She will have evaluation by surgery  Start Pantoprazole 40mg daily    3  Chronic idiopathic constipation  4  Bloating  Start Miralax 1-2 capfuls per day      ____________________________________________________________________    HPI: 75-year-old female who presents for evaluation of abdominal pain and constipation  She reports that she has struggled with constipation for most of her life  She reports that at times she will only have a bowel movement about once a week  She reports that this is typically associated with back pain which alleviates after the bowel movement  Recently she has been having epigastric and right upper quadrant pain  This is new  There is no heartburn but she does report excessive belching  She has not specifically associated this with eating or with bowel movements  She has noted it does not go away after a bowel movement  She discussed this with her family doctor who ordered an ultrasound which was normal and more recently a HIDA with CCK which showed an ejection fraction of 19%  She has an appointment to follow-up with her family doctor tomorrow  She has never taken anything on a consistent basis for her constipation  She has tried to take fiber supplements intermittently  She tries to drink plenty of water throughout the day  She has never had a colonoscopy  REVIEW OF SYSTEMS:    CONSTITUTIONAL: Denies any fever, chills, rigors, and weight loss  HEENT: No earache or tinnitus  Denies hearing loss or visual disturbances  CARDIOVASCULAR: No chest pain or palpitations     RESPIRATORY: Denies any cough, hemoptysis, shortness of breath or dyspnea on exertion  GASTROINTESTINAL: As noted in the History of Present Illness  GENITOURINARY: No problems with urination  Denies any hematuria or dysuria  NEUROLOGIC: No dizziness or vertigo, denies headaches  MUSCULOSKELETAL: Denies any muscle or joint pain  SKIN: Denies skin rashes or itching  ENDOCRINE: Denies excessive thirst  Denies intolerance to heat or cold  PSYCHOSOCIAL: Denies depression or anxiety  Denies any recent memory loss  Historical Information   Past Medical History:   Diagnosis Date   • Allergic rhinitis    • Complete spontaneous      RESOLVED:    • Depression    • Insomnia    • Postpartum depression      History reviewed  No pertinent surgical history    Social History   Social History     Substance and Sexual Activity   Alcohol Use Yes   • Alcohol/week: 1 0 standard drink   • Types: 1 Cans of beer per week    Comment: SOCIAL     Social History     Substance and Sexual Activity   Drug Use Never     Social History     Tobacco Use   Smoking Status Never   Smokeless Tobacco Never     Family History   Problem Relation Age of Onset   • Diabetes Mother    • Heart disease Mother    • No Known Problems Sister    • No Known Problems Sister    • Cervical cancer Maternal Grandmother    • Ovarian cancer Maternal Grandmother    • Asthma Maternal Grandfather    • Diabetes Maternal Grandfather    • Heart disease Maternal Grandfather    • Heart disease Paternal Grandmother    • Ovarian cancer Maternal Aunt    • Insomnia Family    • No Known Problems Paternal Aunt    • No Known Problems Paternal Aunt    • No Known Problems Paternal Aunt    • No Known Problems Paternal Aunt    • No Known Problems Paternal Aunt    • No Known Problems Paternal Aunt    • Breast cancer Neg Hx        Meds/Allergies       Current Outpatient Medications:   •  Multiple Vitamin (MULTI-VITAMIN DAILY PO)  •  pantoprazole (PROTONIX) 40 mg tablet    Allergies   Allergen Reactions   • Other      Environmental   • Penicillins Rash           Objective     Blood pressure 108/68, pulse 83, height 5' 4" (1 626 m), weight 69 9 kg (154 lb), SpO2 99 %  Body mass index is 26 43 kg/m²  PHYSICAL EXAM:      General Appearance:   Alert, cooperative, no distress   HEENT:   Normocephalic, atraumatic, anicteric      Neck:  Supple, symmetrical, trachea midline   Lungs:   Clear to auscultation bilaterally; no rales, rhonchi or wheezing; respirations unlabored    Heart[de-identified]   Regular rate and rhythm; no murmur, rub, or gallop  Abdomen:   Soft, non-tender, non-distended; normal bowel sounds; no masses, no organomegaly    Genitalia:   Deferred    Rectal:   Deferred    Extremities:  No cyanosis, clubbing or edema    Pulses:  2+ and symmetric    Skin:  No jaundice, rashes, or lesions    Lymph nodes:  No palpable cervical lymphadenopathy        Lab Results:   No visits with results within 1 Day(s) from this visit  Latest known visit with results is:   Appointment on 01/27/2023   Component Date Value   • Amylase 01/27/2023 35    • Lipase 01/27/2023 140          Radiology Results:   NM hepatobiliary w rx    Result Date: 2/10/2023  Narrative: HEPATOBILIARY SCAN WITH CHOLECYSTOKININ ADMINISTRATION INDICATION: R10 84: Generalized abdominal pain COMPARISON:  Ultrasound 1/24/2023 TECHNIQUE: Following the intravenous administration of 4 7 mCi Tc-99m labeled mebrofenin, dynamic abdominal images were obtained  over a 60 minute time period  Images were performed in AP projection  FINDINGS: There is prompt, uniform accumulation with normal clearance of the radiopharmaceutical by the liver  There is normal filling of the intrahepatic ducts, common bile duct and gallbladder with normal excretion of the radiopharmaceutical into the duodenum   In order to evaluate the contractile response of the gallbladder in response to cholecystokinin, 1 4 mcg sincalide (0 02 mcg/kg) was administered by slow intravenous infusion over 60 minutes  These images demonstrate diminished contraction of the gallbladder  The calculated gallbladder ejection fraction is 19 % (N = >38%)  Impression: 1  Diminished contractile response of the gallbladder to cholecystokinin infusion  This may be associated with chronic gallbladder dysfunction in the appropriate setting  Workstation performed: [id number]     US right upper quadrant    Result Date: 1/24/2023  Narrative: RIGHT UPPER QUADRANT ULTRASOUND INDICATION:     R10 84: Generalized abdominal pain  COMPARISON:  None TECHNIQUE:   Real-time ultrasound of the right upper quadrant was performed with a curvilinear transducer with both volumetric sweeps and still imaging techniques  FINDINGS: PANCREAS:  Visualized portions of the pancreas are within normal limits  AORTA AND IVC:  Visualized portions are normal for patient age  LIVER: Size:  Within normal range  The liver measures 14 9 cm in the midclavicular line  Contour:  Surface contour is smooth  Parenchyma:  Echogenicity and echotexture are within normal limits  No liver mass identified  Limited imaging of the main portal vein shows it to be patent and hepatopetal   BILIARY: No gallbladder findings  No intrahepatic biliary dilatation  CBD measures 2 0 mm  No choledocholithiasis  KIDNEY: Right kidney measures 10 1 x 3 8 x 5 4 cm  Volume 109 2 mL Kidney within normal limits  ASCITES:   None  Impression: No acute findings   Workstation performed: [id number]   Answers for HPI/ROS submitted by the patient on 2/15/2023  Chronicity: new  Onset: more than 1 month ago  Onset quality: gradual  Frequency: 2 to 4 times per day  Episode duration: 1 Days  Progression since onset: unchanged  Pain location: RUQ, generalized abdominal region  Pain - numeric: 7/10  Pain quality: burning, sharp  Radiates to: RUQ, epigastric region  anorexia: No  arthralgias: No  belching: Yes  constipation: Yes  diarrhea: No  dysuria: No  fever: No  flatus: Yes  frequency: Yes  headaches: Yes  hematochezia: No  hematuria: No  melena: No  myalgias: No  nausea:  No  weight loss: No  vomiting: No  Aggravated by: nothing  Relieved by: nothing

## 2023-02-15 NOTE — LETTER
February 17, 2023     [address] [place] [address]    Patient: Amalia Snyder   YOB: 1970   Date of Visit: 2/15/2023       Dear Dr Sylwia Gomez: Thank you for referring Soo See to me for evaluation  Below are my notes for this consultation  If you have questions, please do not hesitate to call me  I look forward to following your patient along with you  Sincerely,        Eryn Rebolledo PA-C        CC: No Recipients  Eryn Rebolledo PA-C  2/15/2023  3:51 PM  Attested  [facility] - Outpatient Consultation  Amalia Snyder 48 y o  female MRN: [id number]  Encounter: [id number]          ASSESSMENT AND PLAN:      1  Encounter for screening for malignant neoplasm of colon  This will be her first colonoscopy  Average risk  2  Right upper quadrant abdominal pain  3  Belching  HIDA shows EF of 19%  She will have evaluation by surgery  Start Pantoprazole 40mg daily    3  Chronic idiopathic constipation  4  Bloating  Start Miralax 1-2 capfuls per day      ____________________________________________________________________    HPI: 15-year-old female who presents for evaluation of abdominal pain and constipation  She reports that she has struggled with constipation for most of her life  She reports that at times she will only have a bowel movement about once a week  She reports that this is typically associated with back pain which alleviates after the bowel movement  Recently she has been having epigastric and right upper quadrant pain  This is new  There is no heartburn but she does report excessive belching  She has not specifically associated this with eating or with bowel movements  She has noted it does not go away after a bowel movement  She discussed this with her family doctor who ordered an ultrasound which was normal and more recently a HIDA with CCK which showed an ejection fraction of 19%    She has an appointment to follow-up with her family doctor tomorrow  She has never taken anything on a consistent basis for her constipation  She has tried to take fiber supplements intermittently  She tries to drink plenty of water throughout the day  She has never had a colonoscopy  REVIEW OF SYSTEMS:    CONSTITUTIONAL: Denies any fever, chills, rigors, and weight loss  HEENT: No earache or tinnitus  Denies hearing loss or visual disturbances  CARDIOVASCULAR: No chest pain or palpitations  RESPIRATORY: Denies any cough, hemoptysis, shortness of breath or dyspnea on exertion  GASTROINTESTINAL: As noted in the History of Present Illness  GENITOURINARY: No problems with urination  Denies any hematuria or dysuria  NEUROLOGIC: No dizziness or vertigo, denies headaches  MUSCULOSKELETAL: Denies any muscle or joint pain  SKIN: Denies skin rashes or itching  ENDOCRINE: Denies excessive thirst  Denies intolerance to heat or cold  PSYCHOSOCIAL: Denies depression or anxiety  Denies any recent memory loss  Historical Information   Past Medical History:   Diagnosis Date   • Allergic rhinitis    • Complete spontaneous      RESOLVED:    • Depression    • Insomnia    • Postpartum depression      History reviewed  No pertinent surgical history    Social History   Social History     Substance and Sexual Activity   Alcohol Use Yes   • Alcohol/week: 1 0 standard drink   • Types: 1 Cans of beer per week    Comment: SOCIAL     Social History     Substance and Sexual Activity   Drug Use Never     Social History     Tobacco Use   Smoking Status Never   Smokeless Tobacco Never     Family History   Problem Relation Age of Onset   • Diabetes Mother    • Heart disease Mother    • No Known Problems Sister    • No Known Problems Sister    • Cervical cancer Maternal Grandmother    • Ovarian cancer Maternal Grandmother    • Asthma Maternal Grandfather    • Diabetes Maternal Grandfather    • Heart disease Maternal Grandfather    • Heart disease Paternal Grandmother    • Ovarian cancer Maternal Aunt    • Insomnia Family    • No Known Problems Paternal Aunt    • No Known Problems Paternal Aunt    • No Known Problems Paternal Aunt    • No Known Problems Paternal Aunt    • No Known Problems Paternal Aunt    • No Known Problems Paternal Aunt    • Breast cancer Neg Hx        Meds/Allergies        Current Outpatient Medications:   •  Multiple Vitamin (MULTI-VITAMIN DAILY PO)  •  pantoprazole (PROTONIX) 40 mg tablet    Allergies   Allergen Reactions   • Other      Environmental   • Penicillins Rash           Objective      Blood pressure 108/68, pulse 83, height 5' 4" (1 626 m), weight 69 9 kg (154 lb), SpO2 99 %  Body mass index is 26 43 kg/m²  PHYSICAL EXAM:      General Appearance:   Alert, cooperative, no distress   HEENT:   Normocephalic, atraumatic, anicteric      Neck:  Supple, symmetrical, trachea midline   Lungs:   Clear to auscultation bilaterally; no rales, rhonchi or wheezing; respirations unlabored    Heart[de-identified]   Regular rate and rhythm; no murmur, rub, or gallop  Abdomen:   Soft, non-tender, non-distended; normal bowel sounds; no masses, no organomegaly    Genitalia:   Deferred    Rectal:   Deferred    Extremities:  No cyanosis, clubbing or edema    Pulses:  2+ and symmetric    Skin:  No jaundice, rashes, or lesions    Lymph nodes:  No palpable cervical lymphadenopathy        Lab Results:   No visits with results within 1 Day(s) from this visit     Latest known visit with results is:   Appointment on 01/27/2023   Component Date Value   • Amylase 01/27/2023 35    • Lipase 01/27/2023 140          Radiology Results:   NM hepatobiliary w rx    Result Date: 2/10/2023  Narrative: HEPATOBILIARY SCAN WITH CHOLECYSTOKININ ADMINISTRATION INDICATION: R10 84: Generalized abdominal pain COMPARISON:  Ultrasound 1/24/2023 TECHNIQUE: Following the intravenous administration of 4 7 mCi Tc-99m labeled mebrofenin, dynamic abdominal images were obtained  over a 60 minute time period  Images were performed in AP projection  FINDINGS: There is prompt, uniform accumulation with normal clearance of the radiopharmaceutical by the liver  There is normal filling of the intrahepatic ducts, common bile duct and gallbladder with normal excretion of the radiopharmaceutical into the duodenum  In order to evaluate the contractile response of the gallbladder in response to cholecystokinin, 1 4 mcg sincalide (0 02 mcg/kg) was administered by slow intravenous infusion over 60 minutes  These images demonstrate diminished contraction of the gallbladder  The calculated gallbladder ejection fraction is 19 % (N = >38%)  Impression: 1  Diminished contractile response of the gallbladder to cholecystokinin infusion  This may be associated with chronic gallbladder dysfunction in the appropriate setting  Workstation performed: [id number]     US right upper quadrant    Result Date: 1/24/2023  Narrative: RIGHT UPPER QUADRANT ULTRASOUND INDICATION:     R10 84: Generalized abdominal pain  COMPARISON:  None TECHNIQUE:   Real-time ultrasound of the right upper quadrant was performed with a curvilinear transducer with both volumetric sweeps and still imaging techniques  FINDINGS: PANCREAS:  Visualized portions of the pancreas are within normal limits  AORTA AND IVC:  Visualized portions are normal for patient age  LIVER: Size:  Within normal range  The liver measures 14 9 cm in the midclavicular line  Contour:  Surface contour is smooth  Parenchyma:  Echogenicity and echotexture are within normal limits  No liver mass identified  Limited imaging of the main portal vein shows it to be patent and hepatopetal   BILIARY: No gallbladder findings  No intrahepatic biliary dilatation  CBD measures 2 0 mm  No choledocholithiasis  KIDNEY: Right kidney measures 10 1 x 3 8 x 5 4 cm  Volume 109 2 mL Kidney within normal limits  ASCITES:   None       Impression: No acute findings  Workstation performed: [id number]   Answers for HPI/ROS submitted by the patient on 2/15/2023  Chronicity: new  Onset: more than 1 month ago  Onset quality: gradual  Frequency: 2 to 4 times per day  Episode duration: 1 Days  Progression since onset: unchanged  Pain location: RUQ, generalized abdominal region  Pain - numeric: 7/10  Pain quality: burning, sharp  Radiates to: RUQ, epigastric region  anorexia: No  arthralgias: No  belching: Yes  constipation: Yes  diarrhea: No  dysuria: No  fever: No  flatus: Yes  frequency: Yes  headaches: Yes  hematochezia: No  hematuria: No  melena: No  myalgias: No  nausea: No  weight loss: No  vomiting: No  Aggravated by: nothing  Relieved by: nothing      Attestation signed by Ora Gomez DO at 2/15/2023  5:00 PM:  I reviewed the chart  I supervised my physician assistant and I agree with her assessment and plan

## 2023-02-22 ENCOUNTER — CLINICAL SUPPORT (OUTPATIENT)
Dept: GENETICS | Facility: CLINIC | Age: 53
End: 2023-02-22

## 2023-02-22 DIAGNOSIS — Z80.41 FAMILY HISTORY OF OVARIAN CANCER: Primary | ICD-10-CM

## 2023-02-22 NOTE — PROGRESS NOTES
Pre-Test Genetic Counseling Consult Note    Patient Name: Roxann Lara   /Age: 1970/53 y o  Referring Provider: Flavia Ibarra MD    Date of Service: 2023  Genetic Counselor: Tennille Leo MS, [facility]  Interpretation Services: None  [facility]      Fang Pimentel was referred to the [address] [place] [address] and Genetic Assessment Program due to her family history of ovarian cancer  She presents today to discuss the possibility of a hereditary cancer syndrome, options for genetic testing, and implications for her and her family  Z80 41 (ICD-10-CM) - Family history of ovarian cancer    Cancer History and Treatment:     Personal History: No personal history of cancer    Screening Hx:     Breast:  Breast Imaging: Annual most recent 2022  Breast Biopsy: None   Breast Density: Scattered fibroglandular density     Colon:  Colonoscopy: Has an order for first colonoscopy     Gynecologic:  Ovaries and Uterus intact     Skin:  No current screening    Reproductive History  Age at menarche: 15  Age at first live birth: 27  Menopause: Perimenopausal  Hormone replacement: None     Medical and Surgical History  Pertinent surgical history: No past surgical history on file  Pertinent medical history:  Past Medical History:   Diagnosis Date   • Allergic rhinitis    • Complete spontaneous      RESOLVED:    • Depression    • Insomnia    • Postpartum depression        Other History:  Height:   Ht Readings from Last 1 Encounters:   02/15/23 5' 4" (1 626 m)     Weight:   Wt Readings from Last 1 Encounters:   02/15/23 69 9 kg (154 lb)     Relevant Family History   Patient reports no Ashkenazi Confucianism ancestry  Maternal Family History:   Mother (age 67) with no history of cancer; she had a BSO  Uncle (d age ?) with bone and lung cancer; he has a history of tobacco use  Aunt (age 76) with ovarian cancer in her 57's  Aunt (age 79) with ovarian cancer in her 52's  Grandfather (d age 66's) with colon cancer  Grandmother (d age ?) with ovarian cancer     Paternal Family History:  Father (d age 66) with no history of cancer  There is no known history of cancer in paternal relatives     Please refer to the scanned pedigree in the Media Tab for a complete family history     *All history is reported as provided by the patient; records are not available for review, except where indicated  Assessment:  We discussed sporadic, familial and hereditary cancer  We also discussed the many factors that influence our risk for cancer such as age, environmental exposures, lifestyle choices and family history  We reviewed the indications suggestive of a hereditary predisposition to cancer  Genetic testing is indicated for Corey Valle based on the following criteria: Meets NCCN V2 2023 Testing Criteria for Ovarian Cancer Susceptibility Genes: Family history of a maternal grandmother (SDR) and 2 maternal aunts (SDR) with ovarian cancer  The risks, benefits, and limitations of genetic testing were reviewed with the patient, as well as genetic discrimination laws, and possible test results (positive, negative, variants of uncertain significance) and their clinical implications  If positive for a mutation, options for managing cancer risk including increased surveillance, chemoprevention, and in some cases prophylactic surgery were discussed  Corey Valle was informed that if a hereditary cancer syndrome was identified in her, first degree relatives (parents, siblings, and children) have a chance of also inheriting the condition  Genetic testing would allow for predictive genetic testing in other relatives, who may also be at risk depending on their degree of relation  Billing:  Most individuals pay <$100 for hereditary cancer genetic testing   If insurance covers the cost of the testing, individuals may still pay out of pocket secondary to co-pays, co-insurance, or deductibles  If the cost of the testing exceeds $100, the lab will reach out to the patient via phone or e-mail  The patient will then have the option to proceed with the testing, cancel the testing, or elect the self-pay option of $250  Nichelle Pittman verbalized understanding  Plan: Patient decided not to proceed with testing at this time  Nichelle Pittman wanted to discuss this information with her  prior to proceeding  She was provided with information on [facility] billing policy and an [facility] hereditary cancer brochure  Nichelle Pittman can call our office if/when she is ready to proceed with testing  The genetic test discussed today was the [facility] Common Hereditary cancer Panel + RNA 47 gene panel

## 2023-03-06 ENCOUNTER — OFFICE VISIT (OUTPATIENT)
Dept: SURGERY | Facility: CLINIC | Age: 53
End: 2023-03-06

## 2023-03-06 VITALS
SYSTOLIC BLOOD PRESSURE: 120 MMHG | HEIGHT: 64 IN | DIASTOLIC BLOOD PRESSURE: 73 MMHG | WEIGHT: 152 LBS | HEART RATE: 80 BPM | BODY MASS INDEX: 25.95 KG/M2

## 2023-03-06 DIAGNOSIS — R10.13 EPIGASTRIC PAIN: Primary | ICD-10-CM

## 2023-03-06 NOTE — PROGRESS NOTES
Assessment/Plan: Patient presents with epigastric intermittent upper abdominal pain on a daily basis for 2 months  She describes her pain as sharp  Returning to a low-fat diet has not been helpful  Other symptoms include bloating, gas and belching  Imaging reveals no evidence for cholelithiasis  HIDA scan reveals decreased ejection fraction of 19%  She began a PPI recently  She was seen by gastroenterology and upper and lower endoscopy is requested for May  Risk factors for peptic ulcer disease include stress  I explained that the HIDA scan only demonstrates ejection fraction at that 1 hour in time  As she has daily discomfort, her symptoms seem more severe than simply biliary dyskinesia  I recommended a more urgent EGD  Options were offered to her to see if this can be obtained  In addition I asked that she consider doubling her Protonix  She is agreeable  There are no diagnoses linked to this encounter  Subjective:      Patient ID: Frank Gomez is a 48 y o  female  Patient presents for gallbladder consult  States she has had intermittent epigastric pain for 2 months  Ultrasound RUQ 1/24/2023I  MPRESSION:  No acute findings  Hida scan 2/10/2023   IMPRESSION:  1  Diminished contractile response of the gallbladder to cholecystokinin infusion  This may be associated with chronic gallbladder dysfunction in the appropriate setting  Abdominal Pain  The current episode started more than 1 month ago  The problem occurs daily  The problem has been unchanged  The pain is located in the epigastric region  The quality of the pain is sharp  The abdominal pain radiates to the back  Associated symptoms include belching, constipation, flatus and nausea  Nothing aggravates the pain  The pain is relieved by nothing  She has tried antacids for the symptoms  The treatment provided no relief  Prior diagnostic workup includes ultrasound         The following portions of the patient's history were reviewed and updated as appropriate:     She  has a past medical history of Allergic rhinitis, Complete spontaneous , Depression, Insomnia, and Postpartum depression  She  has no past surgical history on file  Her family history includes Asthma in her maternal grandfather; Cancer in her maternal uncle; Cervical cancer in her maternal grandmother; Colon cancer in her maternal grandfather; Diabetes in her maternal grandfather and mother; Heart disease in her maternal grandfather, mother, and paternal grandmother; Insomnia in her family; No Known Problems in her paternal aunt, paternal aunt, paternal aunt, paternal aunt, paternal aunt, paternal aunt, sister, and sister; Ovarian cancer in her maternal aunt, maternal aunt, and maternal grandmother  She  reports that she has never smoked  She has never used smokeless tobacco  She reports current alcohol use of about 1 0 standard drink per week  She reports that she does not use drugs  Current Outpatient Medications   Medication Sig Dispense Refill   • Multiple Vitamin (MULTI-VITAMIN DAILY PO) Take 1 tablet by mouth daily     • pantoprazole (PROTONIX) 40 mg tablet Take 1 tablet (40 mg total) by mouth daily 30 tablet 3     No current facility-administered medications for this visit  She is allergic to other and penicillins       Review of Systems   Constitutional: Negative  Negative for activity change  HENT: Negative  Eyes: Negative  Respiratory: Negative  Cardiovascular: Negative  Gastrointestinal: Positive for abdominal pain, constipation, flatus and nausea  Endocrine: Negative  Genitourinary: Negative  Musculoskeletal: Negative  Skin: Negative  Allergic/Immunologic: Negative  Neurological: Negative  Psychiatric/Behavioral: Negative for agitation, behavioral problems and confusion  The patient is not nervous/anxious  All other systems reviewed and are negative          Objective:      /73   Pulse 80   Ht 5' 4" (1 626 m)   Wt 68 9 kg (152 lb)   BMI 26 09 kg/m²          Physical Exam  Constitutional:       Appearance: Normal appearance  She is well-developed  HENT:      Head: Normocephalic and atraumatic  Nose: Nose normal    Eyes:      Extraocular Movements: Extraocular movements intact  Conjunctiva/sclera: Conjunctivae normal    Cardiovascular:      Rate and Rhythm: Normal rate and regular rhythm  Heart sounds: Normal heart sounds  Pulmonary:      Effort: Pulmonary effort is normal       Breath sounds: Normal breath sounds  Abdominal:      General: Abdomen is flat  Musculoskeletal:      Right lower leg: No edema  Left lower leg: No edema  Skin:     General: Skin is warm and dry  Neurological:      Mental Status: She is alert and oriented to person, place, and time     Psychiatric:         Mood and Affect: Mood normal

## 2023-03-16 ENCOUNTER — PREP FOR PROCEDURE (OUTPATIENT)
Dept: SURGERY | Facility: CLINIC | Age: 53
End: 2023-03-16

## 2023-03-16 DIAGNOSIS — K82.8 BILIARY DYSKINESIA: Primary | ICD-10-CM

## 2023-04-03 ENCOUNTER — HOSPITAL ENCOUNTER (OUTPATIENT)
Dept: RADIOLOGY | Facility: HOSPITAL | Age: 53
Discharge: HOME | End: 2023-04-03

## 2023-04-03 DIAGNOSIS — R14.0 ABDOMINAL DISTENSION (GASEOUS): ICD-10-CM

## 2023-04-03 DIAGNOSIS — R10.11 RIGHT UPPER QUADRANT PAIN: ICD-10-CM

## 2023-04-19 ENCOUNTER — APPOINTMENT (OUTPATIENT)
Dept: LAB | Facility: MEDICAL CENTER | Age: 53
End: 2023-04-19

## 2023-04-19 DIAGNOSIS — R10.9 ABDOMINAL PAIN: ICD-10-CM

## 2023-04-19 LAB
ALBUMIN SERPL BCP-MCNC: 3.6 G/DL (ref 3.5–5)
ALP SERPL-CCNC: 93 U/L (ref 46–116)
ALT SERPL W P-5'-P-CCNC: 44 U/L (ref 12–78)
ANION GAP SERPL CALCULATED.3IONS-SCNC: 3 MMOL/L (ref 4–13)
AST SERPL W P-5'-P-CCNC: 16 U/L (ref 5–45)
BILIRUB SERPL-MCNC: 0.41 MG/DL (ref 0.2–1)
BUN SERPL-MCNC: 17 MG/DL (ref 5–25)
CALCIUM SERPL-MCNC: 9.8 MG/DL (ref 8.3–10.1)
CHLORIDE SERPL-SCNC: 104 MMOL/L (ref 96–108)
CO2 SERPL-SCNC: 28 MMOL/L (ref 21–32)
CREAT SERPL-MCNC: 0.73 MG/DL (ref 0.6–1.3)
GFR SERPL CREATININE-BSD FRML MDRD: 94 ML/MIN/1.73SQ M
GLUCOSE P FAST SERPL-MCNC: 122 MG/DL (ref 65–99)
LIPASE SERPL-CCNC: 113 U/L (ref 73–393)
POTASSIUM SERPL-SCNC: 4.6 MMOL/L (ref 3.5–5.3)
PROT SERPL-MCNC: 8.6 G/DL (ref 6.4–8.4)
SODIUM SERPL-SCNC: 135 MMOL/L (ref 135–147)

## 2023-05-02 ENCOUNTER — OFFICE VISIT (OUTPATIENT)
Dept: SURGERY | Facility: CLINIC | Age: 53
End: 2023-05-02

## 2023-05-02 DIAGNOSIS — R10.13 EPIGASTRIC PAIN: Primary | ICD-10-CM

## 2023-05-02 NOTE — PROGRESS NOTES
Assessment/Plan: Patient is status post laparoscopic cholecystectomy  She returns the office for follow-up visit  At this time she feels well  She is on the path to recovery  Initially, she had called with sharp epigastric pain  Liver function tests were unremarkable  She is eating well  She is advance her activities  Her incisions are clean and healing well  I gave her encouragement  Activity instructions were provided  All questions answered  There are no diagnoses linked to this encounter  Pathology: Reviewed with patient, all questions answered  Postoperative restrictions reviewed  All questions answered  ______________________________________________________  HPI: Patient presents post operatively  Laparoscopic cholecystectomy 4/14/2023  Final Diagnosis  A  Gallbladder:  - Chronic cholecystitis  - Negative for malignancy  ROS:  General ROS: negative for - chills, fatigue, fever or night sweats, weight loss  Respiratory ROS: no cough, shortness of breath, or wheezing  Cardiovascular ROS: no chest pain or dyspnea on exertion  Genito-Urinary ROS: no dysuria, trouble voiding, or hematuria  Musculoskeletal ROS: negative for - gait disturbance, joint pain or muscle pain  Neurological ROS: no TIA or stroke symptoms  GI ROS: see HPI  Skin ROS: no new rashes or lesions   Lymphatic ROS: no new adenopathy noted by pt  GYN ROS: see HPI, no new GYN history or bleeding noted  Psy ROS: no new mental or behavioral disturbances         Patient Active Problem List   Diagnosis    Anxiety    Epigastric pain       Allergies:   Other and Penicillins      Current Outpatient Medications:     Multiple Vitamin (MULTI-VITAMIN DAILY PO), Take 1 tablet by mouth daily, Disp: , Rfl:     oxyCODONE-acetaminophen (PERCOCET) 5-325 mg per tablet, Take 1 tablet by mouth every 4 (four) hours as needed for moderate pain for up to 10 doses Max Daily Amount: 6 tablets, Disp: 10 tablet, Rfl: 0    pantoprazole (PROTONIX) 40 mg tablet, Take 1 tablet (40 mg total) by mouth daily, Disp: 30 tablet, Rfl: 3    Past Medical History:   Diagnosis Date    Allergic rhinitis     Anxiety     Complete spontaneous      RESOLVED:     Depression     Insomnia     Postpartum depression        Past Surgical History:   Procedure Laterality Date    [place] LAPAROSCOPY SURG CHOLECYSTECTOMY N/A 2023    Procedure: CHOLECYSTECTOMY LAPAROSCOPIC;  Surgeon: Phyllis Elmore MD;  [facility];  Service: General       Family History   Problem Relation Age of Onset    Diabetes Mother     Heart disease Mother     No Known Problems Sister     No Known Problems Sister     Cervical cancer Maternal Grandmother     Ovarian cancer Maternal Grandmother     Asthma Maternal Grandfather     Diabetes Maternal Grandfather     Heart disease Maternal Grandfather     Colon cancer Maternal Grandfather     Heart disease Paternal Grandmother     Ovarian cancer Maternal Aunt     Insomnia Family     No Known Problems Paternal Aunt     No Known Problems Paternal Aunt     No Known Problems Paternal Aunt     No Known Problems Paternal Aunt     No Known Problems Paternal Aunt     No Known Problems Paternal Aunt     Cancer Maternal Uncle         Bone/Lung    Ovarian cancer Maternal Aunt     Breast cancer Neg Hx         reports that she has never smoked  She has never used smokeless tobacco  She reports current alcohol use of about 1 0 standard drink per week  She reports that she does not use drugs  PHYSICAL EXAM    There were no vitals taken for this visit      General: normal, cooperative, no distress  Abdominal: soft, nondistended or nontender  Incision: clean, dry, and intact and healing well      Phyllis Elmore MD    Date: 2023 Time: 2:50 PM

## 2023-07-31 ENCOUNTER — OFFICE VISIT (OUTPATIENT)
Dept: PAIN MEDICINE | Facility: CLINIC | Age: 53
End: 2023-07-31
Payer: COMMERCIAL

## 2023-07-31 VITALS
BODY MASS INDEX: 26.87 KG/M2 | HEART RATE: 69 BPM | WEIGHT: 156.53 LBS | SYSTOLIC BLOOD PRESSURE: 112 MMHG | DIASTOLIC BLOOD PRESSURE: 73 MMHG

## 2023-07-31 DIAGNOSIS — M54.50 ACUTE EXACERBATION OF CHRONIC LOW BACK PAIN: Primary | ICD-10-CM

## 2023-07-31 DIAGNOSIS — G89.29 ACUTE EXACERBATION OF CHRONIC LOW BACK PAIN: Primary | ICD-10-CM

## 2023-07-31 DIAGNOSIS — G89.29 CHRONIC RIGHT SI JOINT PAIN: ICD-10-CM

## 2023-07-31 DIAGNOSIS — M47.816 LUMBAR FACET ARTHROPATHY: ICD-10-CM

## 2023-07-31 DIAGNOSIS — M53.3 CHRONIC RIGHT SI JOINT PAIN: ICD-10-CM

## 2023-07-31 PROCEDURE — 99214 OFFICE O/P EST MOD 30 MIN: CPT | Performed by: STUDENT IN AN ORGANIZED HEALTH CARE EDUCATION/TRAINING PROGRAM

## 2023-07-31 RX ORDER — MULTIVIT-MIN/IRON/FOLIC ACID/K 18-600-40
CAPSULE ORAL
COMMUNITY

## 2023-07-31 RX ORDER — METHOCARBAMOL 500 MG/1
500 TABLET, FILM COATED ORAL 2 TIMES DAILY PRN
Qty: 30 TABLET | Refills: 0 | Status: SHIPPED | OUTPATIENT
Start: 2023-07-31

## 2023-07-31 RX ORDER — MELOXICAM 15 MG/1
15 TABLET ORAL DAILY PRN
Qty: 30 TABLET | Refills: 0 | Status: SHIPPED | OUTPATIENT
Start: 2023-07-31

## 2023-07-31 NOTE — PROGRESS NOTES
Pain Medicine Follow-Up Note    Assessment:  1. Acute exacerbation of chronic low back pain    2. Lumbar facet arthropathy    3. Chronic right SI joint pain        Plan:  Orders Placed This Encounter   Procedures   • [place] spine and pain procedure     Standing Status:   Future     Standing Expiration Date:   7/31/2027     Order Specific Question:   Reason for Exam:     Answer:   right SI joint injection     Order Specific Question:   Anticoagulant hold needed? Answer:   no     Order Specific Question:   Is the patient pregnant? Answer:   Unknown       New Medications Ordered This Visit   Medications   • Ascorbic Acid (Vitamin C) 500 MG CAPS     Sig: Take by mouth   • meloxicam (MOBIC) 15 mg tablet     Sig: Take 1 tablet (15 mg total) by mouth daily as needed for moderate pain     Dispense:  30 tablet     Refill:  0   • methocarbamol (ROBAXIN) 500 mg tablet     Sig: Take 1 tablet (500 mg total) by mouth 2 (two) times a day as needed for muscle spasms     Dispense:  30 tablet     Refill:  0       My impressions and treatment recommendations were discussed in detail with the patient who verbalized understanding and had no further questions. This is a 24-year-old female returns her office following right L4-5 and L5-S1 radiofrequency ablation in September 2022. Unfortunately patient only had 1 month of notable relief of the procedure. She appears to have aggravated her baseline low back symptoms but is also having higher right-sided lumbar paraspinal pain which appears to be related to muscle strain. She does continue to have notable tenderness over the right SI joint with pain with provocative maneuvers as noted below. Given lack of relief with RFA and indicators of possible SI joint dysfunction, discussed right SIJ injection fluoroscopic guided similar symptoms. For more immediate symptoms I will also start her on meloxicam 50 mg daily as needed and Robaxin 500 mg twice daily as needed.   Advised not to take muscle relaxant before driving. If there is no relief with this injection and medication options, then would consider ordering MRI of the lumbar spine to assess for significant compressive pathology. Patient may be having atypical presentation of lumbar radiculopathy with right buttock pain. [place] Prescription Drug Monitoring Program report was reviewed and was appropriate       Complete risks and benefits including bleeding, infection, tissue reaction, nerve injury and allergic reaction were discussed. The approach was demonstrated using models and literature was provided. Verbal and written consent was obtained. Discharge instructions were provided. I personally saw and examined the patient and I agree with the above discussed plan of care. History of Present Illness:    Traci Jimenez is a 48 y.o. female who presents to [address] [facility] for interval re-evaluation of the above stated pain complaints. The patient has a past medical and chronic pain history as outlined in the assessment section. She was last seen on 9/8/2022 for right L4-5 and L5-S1 radiofrequency ablation with less than 1 month of relief. She returns today with ongoing low back pain concentrated on the right side. Reports increase in symptoms after lifting some items in her basement 1 week ago. Sitting is quite painful. Plan pain score 10 out of 10. Plan present all day. Constant, burning, throbbing in nature. She points to the right lumbar paraspinal region as a source of significant discomfort. She reports that this is a new area of pain for her. She reports that her typical pain which is also worse and is more concentrated in the lower lumbar/SI joint region as previously noted in past visit. Other than as stated above, the patient denies any interval changes in medications, medical condition, mental condition, symptoms, or allergies since the last office visit.          Review of Systems:    Review of Systems   Musculoskeletal: Positive for back pain. Past Medical History:   Diagnosis Date   • Allergic rhinitis    • Anxiety    • Complete spontaneous      RESOLVED:    • Depression    • Insomnia    • Postpartum depression        Past Surgical History:   Procedure Laterality Date   • [place] LAPAROSCOPY SURG CHOLECYSTECTOMY N/A 2023    Procedure: CHOLECYSTECTOMY LAPAROSCOPIC;  Surgeon: Evelia Phillips MD;  [facility];  Service: General       Family History   Problem Relation Age of Onset   • Diabetes Mother    • Heart disease Mother    • No Known Problems Sister    • No Known Problems Sister    • Cervical cancer Maternal Grandmother    • Ovarian cancer Maternal Grandmother    • Asthma Maternal Grandfather    • Diabetes Maternal Grandfather    • Heart disease Maternal Grandfather    • Colon cancer Maternal Grandfather    • Heart disease Paternal Grandmother    • Ovarian cancer Maternal Aunt    • Insomnia Family    • No Known Problems Paternal Aunt    • No Known Problems Paternal Aunt    • No Known Problems Paternal Aunt    • No Known Problems Paternal Aunt    • No Known Problems Paternal Aunt    • No Known Problems Paternal Aunt    • Cancer Maternal Uncle         Bone/Lung   • Ovarian cancer Maternal Aunt    • Breast cancer Neg Hx        Social History     Occupational History   • Not on file   Tobacco Use   • Smoking status: Never   • Smokeless tobacco: Never   Vaping Use   • Vaping Use: Never used   Substance and Sexual Activity   • Alcohol use:  Yes     Alcohol/week: 1.0 standard drink of alcohol     Types: 1 Cans of beer per week     Comment: SOCIAL   • Drug use: Never   • Sexual activity: Yes     Partners: Male     Birth control/protection: None         Current Outpatient Medications:   •  meloxicam (MOBIC) 15 mg tablet, Take 1 tablet (15 mg total) by mouth daily as needed for moderate pain, Disp: 30 tablet, Rfl: 0  •  methocarbamol (ROBAXIN) 500 mg tablet, Take 1 tablet (500 mg total) by mouth 2 (two) times a day as needed for muscle spasms, Disp: 30 tablet, Rfl: 0  •  Multiple Vitamin (MULTI-VITAMIN DAILY PO), Take 1 tablet by mouth daily, Disp: , Rfl:   •  Ascorbic Acid (Vitamin C) 500 MG CAPS, Take by mouth, Disp: , Rfl:   •  oxyCODONE-acetaminophen (PERCOCET) 5-325 mg per tablet, Take 1 tablet by mouth every 4 (four) hours as needed for moderate pain for up to 10 doses Max Daily Amount: 6 tablets (Patient not taking: Reported on 7/31/2023), Disp: 10 tablet, Rfl: 0  •  pantoprazole (PROTONIX) 40 mg tablet, Take 1 tablet (40 mg total) by mouth daily (Patient not taking: Reported on 7/31/2023), Disp: 30 tablet, Rfl: 3    Allergies   Allergen Reactions   • Other      Environmental   • Penicillins Rash     Ancef okay       Physical Exam:    /73 (BP Location: Left arm, Patient Position: Sitting, Cuff Size: Standard)   Pulse 69   Wt 71 kg (156 lb 8.4 oz)   BMI 26.87 kg/m²     Constitutional:normal, well developed, well nourished, alert, in no distress and non-toxic and no overt pain behavior. Eyes:anicteric  HEENT:grossly intact  Neck:supple, symmetric, trachea midline and no masses   Pulmonary:even and unlabored  Cardiovascular:No edema or pitting edema present  Skin:Normal without rashes or lesions and well hydrated  Psychiatric:Mood and affect appropriate  Neurologic:Cranial Nerves II-XII grossly intact  Musculoskeletal: Tenderness to palpation right SI joint. Positive Gaenslen's, positive thigh thrust, positive Bren on the right.     Imaging  FL spine and pain procedure    (Results Pending)         Orders Placed This Encounter   Procedures   • [place] spine and pain procedure

## 2023-08-29 ENCOUNTER — TELEPHONE (OUTPATIENT)
Dept: PAIN MEDICINE | Facility: CLINIC | Age: 53
End: 2023-08-29

## 2023-08-29 NOTE — TELEPHONE ENCOUNTER
Caller: Rohan Michel     Doctor: Michele Nicole     Reason for call: patient got e-mail that procedure on 9/6 was approved   authorization A [id number]    Call back#: [phone]

## 2023-09-06 ENCOUNTER — HOSPITAL ENCOUNTER (OUTPATIENT)
Dept: RADIOLOGY | Facility: CLINIC | Age: 53
Discharge: HOME | End: 2023-09-06
Admitting: STUDENT IN AN ORGANIZED HEALTH CARE EDUCATION/TRAINING PROGRAM
Payer: COMMERCIAL

## 2023-09-06 VITALS
TEMPERATURE: 97.5 F | RESPIRATION RATE: 20 BRPM | SYSTOLIC BLOOD PRESSURE: 118 MMHG | DIASTOLIC BLOOD PRESSURE: 83 MMHG | HEART RATE: 65 BPM | OXYGEN SATURATION: 98 %

## 2023-09-06 DIAGNOSIS — M53.3 CHRONIC RIGHT SI JOINT PAIN: ICD-10-CM

## 2023-09-06 DIAGNOSIS — G89.29 CHRONIC RIGHT SI JOINT PAIN: ICD-10-CM

## 2023-09-06 PROCEDURE — 27096 INJECT SACROILIAC JOINT: CPT | Performed by: STUDENT IN AN ORGANIZED HEALTH CARE EDUCATION/TRAINING PROGRAM

## 2023-09-06 RX ORDER — METHYLPREDNISOLONE ACETATE 40 MG/ML
40 INJECTION, SUSPENSION INTRA-ARTICULAR; INTRALESIONAL; INTRAMUSCULAR; PARENTERAL; SOFT TISSUE ONCE
Status: COMPLETED | OUTPATIENT
Start: 2023-09-06 | End: 2023-09-06

## 2023-09-06 RX ORDER — BUPIVACAINE HCL/PF 2.5 MG/ML
1 VIAL (ML) INJECTION ONCE
Status: COMPLETED | OUTPATIENT
Start: 2023-09-06 | End: 2023-09-06

## 2023-09-06 RX ADMIN — Medication 1 ML: at 09:52

## 2023-09-06 RX ADMIN — IOHEXOL 0.5 ML: 300 INJECTION, SOLUTION INTRAVENOUS at 09:51

## 2023-09-06 RX ADMIN — METHYLPREDNISOLONE ACETATE 40 MG: 40 INJECTION, SUSPENSION INTRA-ARTICULAR; INTRALESIONAL; INTRAMUSCULAR; PARENTERAL; SOFT TISSUE at 09:52

## 2023-09-06 NOTE — DISCHARGE INSTR - LAB

## 2023-09-06 NOTE — H&P
History of Present Illness:  The patient is a 48 y.o. female who presents with complaints of right low back pain    Past Medical History:   Diagnosis Date   • Allergic rhinitis    • Anxiety    • Complete spontaneous      RESOLVED:    • Depression    • Insomnia    • Postpartum depression        Past Surgical History:   Procedure Laterality Date   • [place] LAPAROSCOPY SURG CHOLECYSTECTOMY N/A 2023    Procedure: CHOLECYSTECTOMY LAPAROSCOPIC;  Surgeon: Rita Jovel MD;  [facility];  Service: General         Current Outpatient Medications:   •  Ascorbic Acid (Vitamin C) 500 MG CAPS, Take by mouth, Disp: , Rfl:   •  meloxicam (MOBIC) 15 mg tablet, Take 1 tablet (15 mg total) by mouth daily as needed for moderate pain, Disp: 30 tablet, Rfl: 0  •  methocarbamol (ROBAXIN) 500 mg tablet, Take 1 tablet (500 mg total) by mouth 2 (two) times a day as needed for muscle spasms, Disp: 30 tablet, Rfl: 0  •  Multiple Vitamin (MULTI-VITAMIN DAILY PO), Take 1 tablet by mouth daily, Disp: , Rfl:   •  oxyCODONE-acetaminophen (PERCOCET) 5-325 mg per tablet, Take 1 tablet by mouth every 4 (four) hours as needed for moderate pain for up to 10 doses Max Daily Amount: 6 tablets (Patient not taking: Reported on 2023), Disp: 10 tablet, Rfl: 0  •  pantoprazole (PROTONIX) 40 mg tablet, Take 1 tablet (40 mg total) by mouth daily (Patient not taking: Reported on 2023), Disp: 30 tablet, Rfl: 3    Allergies   Allergen Reactions   • Other      Environmental   • Penicillins Rash     Ancef okay       Physical Exam:   Vitals:    23 0938   BP: 123/71   Pulse: 64   Resp: 20   Temp: 97.5 °F (36.4 °C)   SpO2: 96%     General: Awake, Alert, Oriented x 3, Mood and affect appropriate  Respiratory: Respirations even and unlabored  Cardiovascular: Peripheral pulses intact; no edema  Musculoskeletal Exam: ttp right SI    ASA Score: 3    Patient/Chart Verification  Patient ID Verified: Verbal  ID Band Applied: No  Consents Confirmed: To be obtained in the Pre-Procedure area  H&P( within 30 days) Verified: To be obtained in the Pre-Procedure area  Interval H&P(within 24 hr) Complete (required for Outpatients and Surgery Admit only): To be obtained in the Pre-Procedure area  Allergies Reviewed: Yes  Anticoag/NSAID held?: NA  Currently on antibiotics?: No  Pregnancy denied?: NA    Assessment:   1.  Chronic right SI joint pain        Plan: right SI joint injection

## 2023-11-16 NOTE — PROGRESS NOTES
Assessment:  1. Chronic right-sided low back pain, unspecified whether sciatica present    2. Sacroiliac joint dysfunction of right side        Plan: This is a 49-year-old female who follows up after right SI joint injection with only about 1 month of improvement in her notable right-sided buttock and posterior hip pain. She also underwent right L4-5 and L5-S1 radiofrequency ablation in 2022 with only short lived relief of her symptoms as well. She again is reporting pain in the SI region without radiation down the right lower extremity. She was reexamined today. She again has tenderness over the right SI joint with positive KRYSTIAN test.  Provocative maneuvers of the hip were negative. She is not complaining of groin pain. No tenderness over the right greater trochanter. Given lack of improvement with injection treatments, will order MRI of the lumbar spine to assess for any significant compressive pathology. The patient notably has degenerative disc disease at the L4-5 and L5-S1 levels, particularly worse at the L5-S1 level. There is foraminal narrowing noted on x-ray. Differential diagnosis includes atypical presentation of lumbar radiculopathy. We will determine next course of action based on results of the MRI. [place] Prescription Drug Monitoring Program report was reviewed and was appropriate     Complete risks and benefits including bleeding, infection, tissue reaction, nerve injury and allergic reaction were discussed. The approach was demonstrated using models and literature was provided. Verbal and written consent was obtained. My impressions and treatment recommendations were discussed in detail with the patient who verbalized understanding and had no further questions. Discharge instructions were provided. I personally saw and examined the patient and I agree with the above discussed plan of care.     Orders Placed This Encounter   Procedures    MRI lumbar spine wo contrast Standing Status:   Future     Standing Expiration Date:   11/17/2027     Scheduling Instructions: There is no preparation for this test. Please leave your jewelry and valuables at home, wedding rings are the exception. Magnetic nail polish must be removed prior to arrival for your test. Please bring your insurance cards, a form of photo ID and a list of your medications with you. Arrive 15       minutes prior to your appointment time in order to register. Please bring any prior CT or MRI studies of this area that were not performed at a [facility]. To schedule this appointment, please contact Central Scheduling at [phone]. Prior to your appointment, please make sure you complete the MRI Screening Form when you e-Check in for your appointment. This will be available starting 7 days before your appointment in [address] [place] [address]. You may receive an e-mail with an activation code if you do not have a [facility] account. If you do not       have access to a device, we will complete your screening at your appointment. Order Specific Question:   What is the patient's sedation requirement? If Medication for Claustrophobia is selected, order medication at this point. Answer:   No Sedation     Order Specific Question:   Does this procedure require the 3T MRI at [facility] or [facility]?     Answer:   No     Order Specific Question:   Release to patient through [employer]     Answer:   Immediate     Order Specific Question:   Is order priority selected as STAT? Answer:   No     Order Specific Question:   Reason for Exam (FREE TEXT)     Answer:   lumbar radiculopathy, right     Order Specific Question:   Is the patient pregnant?      Answer:   Unknown     New Medications Ordered This Visit   Medications    Turmeric (QC Tumeric Complex) 500 MG CAPS     Sig: Take by mouth    Misc Natural Products (Elderberry Immune Complex) CHEW     Sig: Chew       History of Present Illness:  Ellis Christensen is a 48 y.o. female who presents for a follow up office visit in regards to Back Pain (Lower back pain into the right hip). The patient’s current symptoms include low back and right buttock pain. Pain score 7 out of 10. She last underwent right SI joint ejection on 2023 with 1 month of relief. Returns today with pain in the evening. Constant, dull/aching, throbbing in nature. Pain again is notable in the right buttock. Does not radiate down the right lower extremity. I have personally reviewed and/or updated the patient's past medical history, past surgical history, family history, social history, current medications, allergies, and vital signs today. Review of Systems   Musculoskeletal:  Positive for back pain.        Patient Active Problem List   Diagnosis    Anxiety    Epigastric pain       Past Medical History:   Diagnosis Date    Allergic rhinitis     Anxiety     Complete spontaneous      RESOLVED:     Depression     Insomnia     Postpartum depression        Past Surgical History:   Procedure Laterality Date    [place] LAPAROSCOPY SURG CHOLECYSTECTOMY N/A 2023    Procedure: CHOLECYSTECTOMY LAPAROSCOPIC;  Surgeon: Alexandra Chowdary MD;  Location: [facility] MAIN OR;  Service: General       Family History   Problem Relation Age of Onset    Diabetes Mother     Heart disease Mother     No Known Problems Sister     No Known Problems Sister     Cervical cancer Maternal Grandmother     Ovarian cancer Maternal Grandmother     Asthma Maternal Grandfather     Diabetes Maternal Grandfather     Heart disease Maternal Grandfather     Colon cancer Maternal Grandfather     Heart disease Paternal Grandmother     Ovarian cancer Maternal Aunt     Insomnia Family     No Known Problems Paternal Aunt     No Known Problems Paternal Aunt     No Known Problems Paternal Aunt     No Known Problems Paternal Aunt     No Known Problems Paternal Aunt     No Known Problems Paternal Aunt     Cancer Maternal Uncle Bone/Lung    Ovarian cancer Maternal Aunt     Breast cancer Neg Hx        Social History     Occupational History    Not on file   Tobacco Use    Smoking status: Never    Smokeless tobacco: Never   Vaping Use    Vaping Use: Never used   Substance and Sexual Activity    Alcohol use: Yes     Alcohol/week: 1.0 standard drink of alcohol     Types: 1 Cans of beer per week     Comment: SOCIAL    Drug use: Never    Sexual activity: Yes     Partners: Male     Birth control/protection: None       Current Outpatient Medications on File Prior to Visit   Medication Sig    Ascorbic Acid (Vitamin C) 500 MG CAPS Take by mouth    meloxicam (MOBIC) 15 mg tablet Take 1 tablet (15 mg total) by mouth daily as needed for moderate pain (Patient taking differently: Take 15 mg by mouth daily as needed for moderate pain PRN)    methocarbamol (ROBAXIN) 500 mg tablet Take 1 tablet (500 mg total) by mouth 2 (two) times a day as needed for muscle spasms (Patient taking differently: Take 500 mg by mouth 2 (two) times a day as needed for muscle spasms PRN)    Misc Natural Products (Elderberry Immune Complex) CHEW Chew    Multiple Vitamin (MULTI-VITAMIN DAILY PO) Take 1 tablet by mouth daily    Turmeric (QC Tumeric Complex) 500 MG CAPS Take by mouth    oxyCODONE-acetaminophen (PERCOCET) 5-325 mg per tablet Take 1 tablet by mouth every 4 (four) hours as needed for moderate pain for up to 10 doses Max Daily Amount: 6 tablets    pantoprazole (PROTONIX) 40 mg tablet Take 1 tablet (40 mg total) by mouth daily     No current facility-administered medications on file prior to visit. Allergies   Allergen Reactions    Other      Environmental    Penicillins Rash     Ancef okay       Physical Exam:    /75   Pulse 71   Ht 5' 4" (1.626 m)   Wt 68.9 kg (152 lb)   BMI 26.09 kg/m²     Constitutional:normal, well developed, well nourished, alert, in no distress and non-toxic and no overt pain behavior.   Eyes:anicteric  HEENT:grossly intact  Neck:supple, symmetric, trachea midline and no masses   Pulmonary:even and unlabored  Cardiovascular:No edema or pitting edema present  Skin:Normal without rashes or lesions and well hydrated  Psychiatric:Mood and affect appropriate  Neurologic:Cranial Nerves II-XII grossly intact  Musculoskeletal:TTP right SI joint. +MARCEL test on the right. Log rolling/marcel, stinchfield do not cause right groin pain.      Imaging

## 2023-11-17 ENCOUNTER — OFFICE VISIT (OUTPATIENT)
Dept: PAIN MEDICINE | Facility: CLINIC | Age: 53
End: 2023-11-17
Payer: COMMERCIAL

## 2023-11-17 VITALS
HEIGHT: 64 IN | BODY MASS INDEX: 25.95 KG/M2 | HEART RATE: 71 BPM | WEIGHT: 152 LBS | DIASTOLIC BLOOD PRESSURE: 75 MMHG | SYSTOLIC BLOOD PRESSURE: 113 MMHG

## 2023-11-17 DIAGNOSIS — M53.3 SACROILIAC JOINT DYSFUNCTION OF RIGHT SIDE: ICD-10-CM

## 2023-11-17 DIAGNOSIS — M54.50 CHRONIC RIGHT-SIDED LOW BACK PAIN, UNSPECIFIED WHETHER SCIATICA PRESENT: Primary | ICD-10-CM

## 2023-11-17 DIAGNOSIS — G89.29 CHRONIC RIGHT-SIDED LOW BACK PAIN, UNSPECIFIED WHETHER SCIATICA PRESENT: Primary | ICD-10-CM

## 2023-11-17 PROCEDURE — 99214 OFFICE O/P EST MOD 30 MIN: CPT | Performed by: STUDENT IN AN ORGANIZED HEALTH CARE EDUCATION/TRAINING PROGRAM

## 2023-11-17 RX ORDER — VIT C/B6/B5/MAGNESIUM/HERB 173 50-5-6-5MG
CAPSULE ORAL
COMMUNITY

## 2023-11-28 ENCOUNTER — HOSPITAL ENCOUNTER (OUTPATIENT)
Dept: MRI IMAGING | Facility: CLINIC | Age: 53
Discharge: HOME | End: 2023-11-28
Payer: COMMERCIAL

## 2023-11-28 ENCOUNTER — HOSPITAL ENCOUNTER (OUTPATIENT)
Age: 53
Discharge: HOME | End: 2023-11-28
Payer: COMMERCIAL

## 2023-11-28 DIAGNOSIS — Z12.31 ENCOUNTER FOR SCREENING MAMMOGRAM FOR MALIGNANT NEOPLASM OF BREAST: ICD-10-CM

## 2023-11-28 DIAGNOSIS — M54.50 CHRONIC RIGHT-SIDED LOW BACK PAIN, UNSPECIFIED WHETHER SCIATICA PRESENT: ICD-10-CM

## 2023-11-28 DIAGNOSIS — G89.29 CHRONIC RIGHT-SIDED LOW BACK PAIN, UNSPECIFIED WHETHER SCIATICA PRESENT: ICD-10-CM

## 2023-11-28 PROCEDURE — 72148 MRI LUMBAR SPINE W/O DYE: CPT

## 2023-11-28 PROCEDURE — 77067 SCR MAMMO BI INCL CAD: CPT

## 2023-11-28 PROCEDURE — 77063 BREAST TOMOSYNTHESIS BI: CPT

## 2023-12-05 ENCOUNTER — TELEPHONE (OUTPATIENT)
Dept: RADIOLOGY | Facility: CLINIC | Age: 53
End: 2023-12-05

## 2023-12-05 DIAGNOSIS — M54.16 LUMBAR RADICULOPATHY: Primary | ICD-10-CM

## 2023-12-05 NOTE — TELEPHONE ENCOUNTER
S/W pt and advised of results  Pt states she does get pain radiating to her hip and upper part of her thigh  Please advise

## 2023-12-05 NOTE — TELEPHONE ENCOUNTER
----- Message from Alesia Granda MD sent at 12/5/2023 12:53 PM EST -----  Patient's MRI shows a small disc herniation to the right, between her L1-2 area. This is possibly irritating the L2 nerve. When this nerve is irritated it generally causes pain in the upper buttock, side of the hip and into the front of the upper thigh. Does she get any pain radiating to the hip or UPPER part of her thigh?

## 2023-12-05 NOTE — TELEPHONE ENCOUNTER
Order placed for the L1-2 LESI. If there is no relief with this I am going to have her see a spine surgeon for another opinion.

## 2024-02-06 ENCOUNTER — HOSPITAL ENCOUNTER (OUTPATIENT)
Dept: RADIOLOGY | Facility: CLINIC | Age: 54
Discharge: HOME | End: 2024-02-06
Payer: COMMERCIAL

## 2024-02-06 VITALS
HEART RATE: 68 BPM | RESPIRATION RATE: 20 BRPM | TEMPERATURE: 97.7 F | DIASTOLIC BLOOD PRESSURE: 62 MMHG | SYSTOLIC BLOOD PRESSURE: 114 MMHG | OXYGEN SATURATION: 96 %

## 2024-02-06 DIAGNOSIS — M54.16 LUMBAR RADICULOPATHY: ICD-10-CM

## 2024-02-06 PROCEDURE — 62323 NJX INTERLAMINAR LMBR/SAC: CPT | Performed by: STUDENT IN AN ORGANIZED HEALTH CARE EDUCATION/TRAINING PROGRAM

## 2024-02-06 RX ORDER — METHYLPREDNISOLONE ACETATE 80 MG/ML
80 INJECTION, SUSPENSION INTRA-ARTICULAR; INTRALESIONAL; INTRAMUSCULAR; PARENTERAL; SOFT TISSUE ONCE
Status: COMPLETED | OUTPATIENT
Start: 2024-02-06 | End: 2024-02-06

## 2024-02-06 RX ADMIN — METHYLPREDNISOLONE ACETATE 80 MG: 80 INJECTION, SUSPENSION INTRA-ARTICULAR; INTRALESIONAL; INTRAMUSCULAR; PARENTERAL; SOFT TISSUE at 14:56

## 2024-02-06 RX ADMIN — IOHEXOL 1 ML: 300 INJECTION, SOLUTION INTRAVENOUS at 14:56

## 2024-02-06 NOTE — H&P
History of Present Illness: The patient is a 53 y.o. female who presents with complaints of right back pain    Past Medical History:   Diagnosis Date    Allergic rhinitis     Anxiety     Complete spontaneous      RESOLVED:     Depression     Insomnia     Postpartum depression        Past Surgical History:   Procedure Laterality Date    [place] LAPAROSCOPY SURG CHOLECYSTECTOMY N/A 2023    Procedure: CHOLECYSTECTOMY LAPAROSCOPIC;  Surgeon: Lars Virgen MD;  Location: [facility] MAIN OR;  Service: General         Current Outpatient Medications:     Ascorbic Acid (Vitamin C) 500 MG CAPS, Take by mouth, Disp: , Rfl:     meloxicam (MOBIC) 15 mg tablet, Take 1 tablet (15 mg total) by mouth daily as needed for moderate pain (Patient taking differently: Take 15 mg by mouth daily as needed for moderate pain PRN), Disp: 30 tablet, Rfl: 0    methocarbamol (ROBAXIN) 500 mg tablet, Take 1 tablet (500 mg total) by mouth 2 (two) times a day as needed for muscle spasms (Patient taking differently: Take 500 mg by mouth 2 (two) times a day as needed for muscle spasms PRN), Disp: 30 tablet, Rfl: 0    Misc Natural Products (Elderberry Immune Complex) CHEW, Chew, Disp: , Rfl:     Multiple Vitamin (MULTI-VITAMIN DAILY PO), Take 1 tablet by mouth daily, Disp: , Rfl:     oxyCODONE-acetaminophen (PERCOCET) 5-325 mg per tablet, Take 1 tablet by mouth every 4 (four) hours as needed for moderate pain for up to 10 doses Max Daily Amount: 6 tablets, Disp: 10 tablet, Rfl: 0    pantoprazole (PROTONIX) 40 mg tablet, Take 1 tablet (40 mg total) by mouth daily, Disp: 30 tablet, Rfl: 3    Turmeric (QC Tumeric Complex) 500 MG CAPS, Take by mouth, Disp: , Rfl:     Allergies   Allergen Reactions    Other      Environmental    Penicillins Rash     Ancef okay       Physical Exam: There were no vitals filed for this visit.  General: Awake, Alert, Oriented x 3, Mood and affect appropriate  Respiratory: Respirations even and  unlabored  Cardiovascular: Peripheral pulses intact; no edema  Musculoskeletal Exam: back non erythematous no lesions    ASA Score: 3         Assessment:   1. Lumbar radiculopathy        Plan: right L1-2 LESI

## 2024-02-06 NOTE — DISCHARGE INSTR - LAB
Epidural Steroid Injection   WHAT YOU NEED TO KNOW:   An epidural steroid injection (HERB) is a procedure to inject steroid medicine into the epidural space. The epidural space is between your spinal cord and vertebrae. Steroids reduce inflammation and fluid buildup in your spine that may be causing pain. You may be given pain medicine along with the steroids.          ACTIVITY  Do not drive or operate machinery today.  No strenuous activity today - bending, lifting, etc.  You may resume normal activites starting tomorrow - start slowly and as tolerated.  You may shower today, but no tub baths or hot tubs.  You may have numbness for several hours from the local anesthetic. Please use caution and common sense, especially with weight-bearing activities.    CARE OF THE INJECTION SITE  If you have soreness or pain, apply ice to the area today (20 minutes on/20 minutes off).  Starting tomorrow, you may use warm, moist heat or ice if needed.  You may have an increase or change in your discomfort for 36-48 hours after your treatment.  Apply ice and continue with any pain medication you have been prescribed.  Notify the [facility] if you have any of the following: redness, drainage, swelling, headache, stiff neck or fever above 100°F.    SPECIAL INSTRUCTIONS  Our office will contact you in approximately 7 days for a progress report.    MEDICATIONS  Continue to take all routine medications.  Our office may have instructed you to hold some medications.    As no general anesthesia was used in today's procedure, you should not experience any side effects related to anesthesia.     If you are diabetic, the steroids used in today's injection may temporarily increase your blood sugar levels after the first few days after your injection. Please keep a close eye on your sugars and alert the doctor who manages your diabetes if your sugars are significantly high from your baseline or you are symptomatic.     If you have a  problem specifically related to your procedure, please call our office at [phone].  Problems not related to your procedure should be directed to your primary care physician.

## 2024-07-02 ENCOUNTER — APPOINTMENT (OUTPATIENT)
Dept: RADIOLOGY | Facility: AMBULARY SURGERY CENTER | Age: 54
End: 2024-07-02
Attending: STUDENT IN AN ORGANIZED HEALTH CARE EDUCATION/TRAINING PROGRAM
Payer: COMMERCIAL

## 2024-07-02 ENCOUNTER — OFFICE VISIT (OUTPATIENT)
Dept: OBGYN CLINIC | Facility: CLINIC | Age: 54
End: 2024-07-02
Payer: COMMERCIAL

## 2024-07-02 VITALS — BODY MASS INDEX: 26.77 KG/M2 | WEIGHT: 156.8 LBS | HEIGHT: 64 IN

## 2024-07-02 DIAGNOSIS — M67.449 MUCOUS CYST OF FINGER: Primary | ICD-10-CM

## 2024-07-02 DIAGNOSIS — M67.449 MUCOUS CYST OF FINGER: ICD-10-CM

## 2024-07-02 PROCEDURE — 99204 OFFICE O/P NEW MOD 45 MIN: CPT | Performed by: STUDENT IN AN ORGANIZED HEALTH CARE EDUCATION/TRAINING PROGRAM

## 2024-07-02 PROCEDURE — 73140 X-RAY EXAM OF FINGER(S): CPT

## 2024-07-02 NOTE — PROGRESS NOTES
ORTHOPAEDIC HAND, WRIST, AND ELBOW OFFICE  VISIT      ASSESSMENT/PLAN:      Diagnoses and all orders for this visit:    Mucous cyst of finger  -     XR finger right third digit-middle; Future          54 y.o. female with mucous cyst at DIP of the right long finger  Treatment options and expected outcomes were discussed. Non operative treatment includes compression. Operative treatment includes removal of the mucous cyst at the DIP.   Discussed risks and benefits of the cyst removal, including recurrence, extensor lag, and nail deformity.  Cautioned patient of self-aspiration and risk of infection.  The patient verbalized understanding of exam findings and treatment plan.   The patient was given the opportunity to ask questions.  Questions were answered to the patient's satisfaction.  The patient decided to move forward with monitoring the mucous cyst at this time. Patient would like to take time to think about proceeding with the procedure. Discussed if cyst drains, we recommend surgical management as drainage from cyst carries risk of infection. Discussed risk of worsening nail deformity with conservative management.      Follow Up:  PRN       To Do Next Visit:  Re-evaluation of current issue      Discussions:  The anatomy and physiology of the diagnosis(es) was(were) discussed with the patient today in the office. Treatment options and recommendations were discussed, as well as expected future outcomes.       Raymundo Pa MD  Attending, Orthopaedic Surgery  Hand, Wrist, and Elbow Surgery  [facility]    ______________________________________________________________________________________________    CHIEF COMPLAINT:  Chief Complaint   Patient presents with   • Right Middle Finger - Cyst       SUBJECTIVE:  Patient is a 54 y.o. LHD female who presents today for evaluation and treatment of a mucous cyst of the right long finger. 3 weeks ago she noticed a red, puffy lump that she believed  was due to a thorn when pulling weeds. She popped the lump and thick, clear fluid was expressed. The lump then re-formed and was popped again, with thick, clear fluid was expressed.  Patient also noticed a deformity of the finger nail on the right long finger.     Occupation:  with [employer]     I have personally reviewed all the relevant PMH, PSH, SH, FH, Medications and allergies      PAST MEDICAL HISTORY:  Past Medical History:   Diagnosis Date   • Allergic rhinitis    • Anxiety    • Complete spontaneous      RESOLVED:    • Depression    • Insomnia    • Postpartum depression        PAST SURGICAL HISTORY:  Past Surgical History:   Procedure Laterality Date   • [place] LAPAROSCOPY SURG CHOLECYSTECTOMY N/A 2023    Procedure: CHOLECYSTECTOMY LAPAROSCOPIC;  Surgeon: Lars Virgen MD;  Location: [facility] MAIN OR;  Service: General       FAMILY HISTORY:  Family History   Problem Relation Age of Onset   • Diabetes Mother    • Heart disease Mother    • No Known Problems Sister    • No Known Problems Sister    • Cervical cancer Maternal Grandmother    • Ovarian cancer Maternal Grandmother    • Asthma Maternal Grandfather    • Diabetes Maternal Grandfather    • Heart disease Maternal Grandfather    • Colon cancer Maternal Grandfather    • Heart disease Paternal Grandmother    • Ovarian cancer Maternal Aunt 63   • Cervical cancer Maternal Aunt 44   • Cancer Maternal Uncle         Bone/Lung   • No Known Problems Paternal Aunt    • No Known Problems Paternal Aunt    • No Known Problems Paternal Aunt    • No Known Problems Paternal Aunt    • No Known Problems Paternal Aunt    • No Known Problems Paternal Aunt    • Insomnia Family    • Breast cancer Neg Hx        SOCIAL HISTORY:  Social History     Tobacco Use   • Smoking status: Never   • Smokeless tobacco: Never   Vaping Use   • Vaping status: Never Used   Substance Use Topics   • Alcohol use: Yes     Alcohol/week: 1.0 standard drink of  "alcohol     Types: 1 Cans of beer per week     Comment: SOCIAL   • Drug use: Never       MEDICATIONS:  No current outpatient medications on file.    ALLERGIES:  Allergies   Allergen Reactions   • Other      Environmental   • Penicillins Rash     Ancef okay           REVIEW OF SYSTEMS:  Musculoskeletal:        As noted in HPI.   All other systems reviewed and are negative.    VITALS:  There were no vitals filed for this visit.    LABS:  HgA1c: No results found for: \"HGBA1C\"  BMP:   Lab Results   Component Value Date    CALCIUM 9.8 04/19/2023    K 4.6 04/19/2023    CO2 28 04/19/2023     04/19/2023    BUN 17 04/19/2023    CREATININE 0.73 04/19/2023       _____________________________________________________  PHYSICAL EXAMINATION:  General: Well developed and well nourished, alert & oriented x 3, appears comfortable  Psychiatric: Normal  HEENT: Normocephalic, Atraumatic Trachea Midline, No torticollis  Pulmonary: No audible wheezing or respiratory distress   Abdomen/GI: Non tender, non distended   Cardiovascular: No pitting edema, 2+ radial pulse   Skin: No erythema, lacerations, fluctation, ulcerations. Small 5 mm x 5 mm x 2 mm mass on distal right long finger  Neurovascular: Sensation Intact to the Median, Ulnar, Radial Nerve, Motor Intact to the Median, Ulnar, Radial Nerve, and Pulses Intact  Musculoskeletal: Normal, except as noted in detailed exam and in HPI.      MUSCULOSKELETAL EXAMINATION:  5mm x 5mm x 2mm lump with fluid at the level of eponychium  Nail deformity with waves and indentations at radial aspect of finger nail on right long finger     ___________________________________________________  STUDIES REVIEWED:  Xrays of the right long finger  were reviewed and independently interpreted in PACS by Dr. Pa and demonstrate mild narrowing of the DIP joint of the third digit on the right hand.          PROCEDURES PERFORMED:  Procedures  No Procedures performed " today    _____________________________________________________      Scribe Attestation    I,:  Ellen Kwan am acting as a scribe while in the presence of the attending physician.:       I,:  Raymundo Pa MD personally performed the services described in this documentation    as scribed in my presence.:

## 2025-03-26 ENCOUNTER — OFFICE VISIT (OUTPATIENT)
Age: 55
End: 2025-03-26
Payer: COMMERCIAL

## 2025-03-26 VITALS — HEIGHT: 64 IN | RESPIRATION RATE: 16 BRPM | WEIGHT: 156 LBS | BODY MASS INDEX: 26.63 KG/M2

## 2025-03-26 DIAGNOSIS — M21.962 ACQUIRED DEFORMITY OF BOTH FEET: ICD-10-CM

## 2025-03-26 DIAGNOSIS — M21.42 ACQUIRED FLAT FOOT, LEFT: ICD-10-CM

## 2025-03-26 DIAGNOSIS — M72.2 PLANTAR FASCIITIS: Primary | ICD-10-CM

## 2025-03-26 DIAGNOSIS — M79.671 RIGHT FOOT PAIN: ICD-10-CM

## 2025-03-26 DIAGNOSIS — M21.41 ACQUIRED FLAT FOOT, RIGHT: ICD-10-CM

## 2025-03-26 DIAGNOSIS — M54.16 RADICULOPATHY OF LUMBAR REGION: ICD-10-CM

## 2025-03-26 DIAGNOSIS — M21.961 ACQUIRED DEFORMITY OF BOTH FEET: ICD-10-CM

## 2025-03-26 PROCEDURE — 20550 NJX 1 TENDON SHEATH/LIGAMENT: CPT | Performed by: PODIATRIST

## 2025-03-26 PROCEDURE — 99203 OFFICE O/P NEW LOW 30 MIN: CPT | Performed by: PODIATRIST

## 2025-03-26 RX ORDER — MELOXICAM 15 MG/1
15 TABLET ORAL DAILY
Qty: 30 TABLET | Refills: 0 | Status: SHIPPED | OUTPATIENT
Start: 2025-03-26 | End: 2025-04-25

## 2025-03-26 RX ORDER — TRIAMCINOLONE ACETONIDE 40 MG/ML
20 INJECTION, SUSPENSION INTRA-ARTICULAR; INTRAMUSCULAR
Status: SHIPPED | OUTPATIENT
Start: 2025-03-26

## 2025-03-26 RX ADMIN — TRIAMCINOLONE ACETONIDE 20 MG: 40 INJECTION, SUSPENSION INTRA-ARTICULAR; INTRAMUSCULAR at 14:00

## 2025-03-26 NOTE — PROGRESS NOTES
Assessment.  Plantar fasciitis.  Acquired deformity foot bilateral.  Pronation syndrome.  Acquired deformity of foot.  Acquired pes planus.  Right foot pain.  History radiculopathy.    Plan.  Chart reviewed.  PCP notes reviewed.  Patient evaluated.  At this time we will treat for Planter fasciitis.  Trigger point injection done.  Patient be placed on Mobic.  Patient may need physical therapy.  In order to help with pronation, she will benefit from orthotics.  Reviewed and casted for these today.  She will use these daily to control deformity and ease pain.  Aftercare instruction given.  Return as needed.    Foot/lower extremity injection    Performed by: Lars Rojo DPM  Authorized by: Lars Rojo DPM    Procedure:     Other Assisting Provider: No      Verbal consent obtained?: Yes      Written consent obtained?: No      Risks and benefits: Risks, benefits and alternatives were discussed      Consent given by:  Patient    Time out: Immediately prior to the procedure a time out was called      Time out performed at:  3/26/2025 2:06 PM    Patient states understanding of procedure being performed: Yes      Patient identity confirmed:  Verbally with patient    Supporting Documentation:     Indications:  Pain and therapeutic    Procedure Details:    Prep: patient was prepped and draped in usual sterile fashion                Ethyl Chloride was applied      Needle size: 25 G G    Ultrasound Guidance: no      Approach:  Medial    Laterality:  Right    Cyst Aspiration/Injection: No      Location: aponeurosis      Aponeurosis Structures: Plantar fascia origin      Injection Information:       Medications:  20 mg triamcinolone acetonide 40 mg/mL    Patient tolerance:  Patient tolerated the procedure well with no immediate complications    Dressing: sterile dressing applied               Diagnoses and all orders for this visit:    Plantar fasciitis  -     Device Prior Authorization; Future  -     meloxicam (MOBIC) 15 mg  tablet; Take 1 tablet (15 mg total) by mouth daily    Acquired deformity of both feet  -     Device Prior Authorization; Future  -     meloxicam (MOBIC) 15 mg tablet; Take 1 tablet (15 mg total) by mouth daily    Acquired flat foot, right  -     Device Prior Authorization; Future  -     meloxicam (MOBIC) 15 mg tablet; Take 1 tablet (15 mg total) by mouth daily    Acquired flat foot, left  -     Device Prior Authorization; Future  -     meloxicam (MOBIC) 15 mg tablet; Take 1 tablet (15 mg total) by mouth daily    Right foot pain    Radiculopathy of lumbar region          Subjective: Patient has pain.  She has pain in her right heel.  It hurts when she gets up and also at the end of the day.  Patient has no numbness or tingling in her feet though she has chronic low back pain.    Allergies   Allergen Reactions    Other      Environmental    Penicillins Rash     Ancef jenny         Current Outpatient Medications:     meloxicam (MOBIC) 15 mg tablet, Take 1 tablet (15 mg total) by mouth daily, Disp: 30 tablet, Rfl: 0    Patient Active Problem List   Diagnosis    Anxiety    Epigastric pain          Patient ID: Tia Marrufo is a 55 y.o. female.    HPI    The following portions of the patient's history were reviewed and updated as appropriate:     family history includes Asthma in her maternal grandfather; Cancer in her maternal uncle; Cervical cancer in her maternal grandmother; Cervical cancer (age of onset: 44) in her maternal aunt; Colon cancer in her maternal grandfather; Diabetes in her maternal grandfather and mother; Heart disease in her maternal grandfather, mother, and paternal grandmother; Insomnia in her family; No Known Problems in her paternal aunt, paternal aunt, paternal aunt, paternal aunt, paternal aunt, paternal aunt, sister, and sister; Ovarian cancer in her maternal grandmother; Ovarian cancer (age of onset: 63) in her maternal aunt.      reports that she has never smoked. She has never used  smokeless tobacco. She reports current alcohol use of about 1.0 standard drink of alcohol per week. She reports that she does not use drugs.    Vitals:    03/26/25 1353   Resp: 16       Review of Systems      Objective:  Patient's shoes and socks removed.   Foot Exam    General  General Appearance: appears stated age and healthy   Orientation: alert and oriented to person, place, and time   Affect: appropriate   Gait: antalgic       Right Foot/Ankle     Inspection and Palpation  Tenderness: calcaneus tenderness, bony tenderness and plantar fascia   Swelling: none   Arch: pes planus  Skin Exam: skin intact;     Neurovascular  Dorsalis pedis: 3+  Posterior tibial: 3+  Saphenous nerve sensation: diminished  Tibial nerve sensation: diminished  Superficial peroneal nerve sensation: diminished  Deep peroneal nerve sensation: diminished  Sural nerve sensation: diminished      Left Foot/Ankle      Inspection and Palpation  Swelling: none   Arch: pes planus  Skin Exam: skin intact;     Neurovascular  Dorsalis pedis: 3+  Posterior tibial: 3+  Saphenous nerve sensation: diminished  Tibial nerve sensation: diminished  Superficial peroneal nerve sensation: diminished  Deep peroneal nerve sensation: diminished  Sural nerve sensation: diminished      Physical Exam  Vitals and nursing note reviewed.   Constitutional:       Appearance: Normal appearance.   Cardiovascular:      Rate and Rhythm: Normal rate and regular rhythm.      Pulses:           Dorsalis pedis pulses are 3+ on the right side and 3+ on the left side.        Posterior tibial pulses are 3+ on the right side and 3+ on the left side.   Musculoskeletal:      Right foot: Bony tenderness present.   Feet:      Comments: Pain with palpation right plantar fascia insertion.  Negative Tinel sign.  Negative pain with compression or tuning fork test of calcaneus  Skin:     Capillary Refill: Capillary refill takes less than 2 seconds.   Neurological:      Mental Status: She is  alert.   Psychiatric:         Mood and Affect: Mood normal.         Behavior: Behavior normal.         Thought Content: Thought content normal.         Judgment: Judgment normal.

## 2025-03-28 ENCOUNTER — TELEPHONE (OUTPATIENT)
Age: 55
End: 2025-03-28

## 2025-03-28 NOTE — TELEPHONE ENCOUNTER
Caller: Rubia    Doctor and/or Office: Dr. Rojo     #: [phone]     Escalation: Care Returning your call asked if you can please call her back  Thank you

## 2025-04-10 ENCOUNTER — OFFICE VISIT (OUTPATIENT)
Dept: OBGYN CLINIC | Facility: MEDICAL CENTER | Age: 55
End: 2025-04-10
Payer: COMMERCIAL

## 2025-04-10 VITALS
SYSTOLIC BLOOD PRESSURE: 132 MMHG | BODY MASS INDEX: 26.8 KG/M2 | HEIGHT: 64 IN | WEIGHT: 157 LBS | DIASTOLIC BLOOD PRESSURE: 80 MMHG

## 2025-04-10 DIAGNOSIS — Z12.31 ENCOUNTER FOR SCREENING MAMMOGRAM FOR MALIGNANT NEOPLASM OF BREAST: ICD-10-CM

## 2025-04-10 DIAGNOSIS — Z01.419 ENCOUNTER FOR GYNECOLOGICAL EXAMINATION (GENERAL) (ROUTINE) WITHOUT ABNORMAL FINDINGS: Primary | ICD-10-CM

## 2025-04-10 DIAGNOSIS — Z80.41 FAMILY HISTORY OF OVARIAN CANCER: ICD-10-CM

## 2025-04-10 DIAGNOSIS — D22.9 CHANGE IN MOLE: ICD-10-CM

## 2025-04-10 PROCEDURE — 99396 PREV VISIT EST AGE 40-64: CPT | Performed by: CLINICAL NURSE SPECIALIST

## 2025-04-10 NOTE — PROGRESS NOTES
Name: Tia Marrufo      : 1970      MRN: [id number]  Encounter Provider: MELANIE Woodward  Encounter Date: 4/10/2025   Encounter department: [facility] [place]    :  Assessment & Plan  Encounter for gynecological examination (general) (routine) without abnormal findings  Annual GYN examination completed today.   Normal findings on exam today other than mole on breast   Health maintenance reviewed/updated as appropriate  Risk prevention and anticipatory guidance provided including:  Encouraged regular self breast exams and to call with changes   Age related Calcium and vitamin D intake  Dietary and lifestyle recommendations based on her age and weight. body mass index is 26.95 kg/m²..    Tobacco and alcohol use, intervention ordered if applicable.   Condom use for prevention of STI's.       Encounter for screening mammogram for malignant neoplasm of breast  Overdue for mammo- order placed.  Orders:  •  Mammo screening bilateral w 3d and cad; Future    Family history of ovarian cancer  Mom and 2 maternal aunts with Ovarian cancer. Reviewed genetics counseling opportunity for pt to further stratify her risks for certain cancers through genetic testing.  Also reviewed implicated for changes in screening based on results of testing.  Reports she started the process, but never went for appt. Referral declined. Aware can request at any time.       Change in mole  Pt has mole on left breast- was itchy and improved with cortisone. On exam is oval- well defined edged, uniform coloring-  light brown/tan. No concerning features to suggest malignancy. Slightly scaly- may be actinic keratosis. Advised f/u with Pcp or Derm.                   Subjective:      History of Present Illness     Tia Marrufo is a 55 y.o. female. Here for Gynecologic Exam (Postmenopausal /Pap 22 -/-/Mammo 23 bi-rads 1 /Colonoscopy  recall 5 years per patient /Maternal grandmother  ovarian and cervical cancer/Maternal aunt cervical cancer /Maternal aunt ovarian cancer )      Patient is menopausal.  She denies PMB.  Has occasional hot flashes- tolerable   She denies any C/O abnormal vaginal discharge or irritation.   Denies Urinary complaints- other than mild urgency if waits too long.   Denies breast changes  Does have a mole on her left breast- was itchy for awhile- used OTC cortisone and better    Sexually active: yes, /Monogamous  Denies C/O related to intimacy/sexual activity  Contraception: n/a-   Denies dryness    She reports she feels safe at home.   Lifestyle/exercise: going to gym 5 days per wk , since december  Dietary calcium/vit D  intake: adequate    HEALTH MAINTENANCE SCREENINGS:     Previous pap smears and ASCCP screening guidelines have been reviewed.   Last Pap:  06/17/2022; Next due 2027  History of abnormal pap: no,   Last mammogram:  11/28/2023- nml  Last Colon Cancer Screening: colonoscopy: 2023 [place] [facility]. Recall 5yrs    Hereditary Cancer Screening  FH Breast cancer: no  FH Ovarian cancer: Mom, and mat aunt x 2  FH Uterine cancer: no  FH Colon cancer: no  Pt declined genetics referral    Substance Abuse Screening Completed. See hx and flowsheet.  Review of Systems   Constitutional:  Negative for appetite change, chills, fatigue, fever and unexpected weight change.   HENT: Negative.     Eyes: Negative.    Respiratory:  Negative for chest tightness and shortness of breath.    Cardiovascular:  Negative for chest pain and palpitations.   Gastrointestinal:  Negative for abdominal pain, constipation and vomiting.   Endocrine: Negative for cold intolerance and heat intolerance.   Genitourinary:         As per HPI   Musculoskeletal:  Negative for back pain, joint swelling and neck pain.   Skin:  Negative for color change and rash.   Neurological:  Negative for dizziness, weakness and numbness.   Hematological:  Does not bruise/bleed easily.  "  Psychiatric/Behavioral: Negative.       The following portions of the patient's history were reviewed and updated as appropriate: allergies, current medications, past family history, past medical history, past social history, past surgical history, and problem list.         Objective   /80 (BP Location: Left arm, Patient Position: Sitting, Cuff Size: Standard)   Ht 5' 4\" (1.626 m)   Wt 71.2 kg (157 lb)   LMP 10/17/2022 (Exact Date)   BMI 26.95 kg/m²     Physical Exam  Constitutional:       General: She is not in acute distress.     Appearance: Normal appearance.   Genitourinary:      Vulva and rectum normal.      No lesions in the vagina.      Right Labia: No rash or lesions.     Left Labia: No lesions or rash.     No vaginal discharge, erythema, tenderness or bleeding.        Right Adnexa: not tender and no mass present.     Left Adnexa: not tender and no mass present.     No cervical motion tenderness, discharge or friability.      Uterus is not enlarged or tender.      No urethral prolapse present.      Pelvic exam was performed with patient in the lithotomy position.   Breasts:     Breasts are symmetrical.      Right: No inverted nipple, mass, nipple discharge, skin change or tenderness.      Left: Skin change present. No inverted nipple, mass, nipple discharge or tenderness.   HENT:      Head: Normocephalic and atraumatic.   Cardiovascular:      Rate and Rhythm: Normal rate.      Heart sounds: No murmur heard.  Pulmonary:      Effort: Pulmonary effort is normal.      Breath sounds: Normal breath sounds.   Chest:       Abdominal:      General: There is no distension.      Palpations: Abdomen is soft.      Tenderness: There is no abdominal tenderness.   Musculoskeletal:         General: Normal range of motion.      Cervical back: Normal range of motion.   Lymphadenopathy:      Cervical: No cervical adenopathy.   Neurological:      Mental Status: She is alert and oriented to person, place, and time. "   Skin:     General: Skin is warm and dry.   Psychiatric:         Mood and Affect: Mood normal.         Behavior: Behavior normal.   Vitals reviewed.

## 2025-04-21 ENCOUNTER — TELEPHONE (OUTPATIENT)
Age: 55
End: 2025-04-21

## 2025-04-21 NOTE — TELEPHONE ENCOUNTER
Spoke with patient she is waiting for her next billing cycle with her credit card which is 4/23/25 and she will call back to make self pay payment for orthotics

## 2025-04-21 NOTE — TELEPHONE ENCOUNTER
Caller: Tia Marrufo    Doctor and/or Office: Dr. Rojo/Surya    #: [phone]    Escalation: Care/Asking for call back from office about orthotics and self pay price and time frame. Please call back and advise. Thanks

## 2025-05-01 ENCOUNTER — HOSPITAL ENCOUNTER (OUTPATIENT)
Age: 55
Discharge: HOME | End: 2025-05-01
Payer: COMMERCIAL

## 2025-05-01 VITALS — WEIGHT: 157 LBS | HEIGHT: 64 IN | BODY MASS INDEX: 26.8 KG/M2

## 2025-05-01 DIAGNOSIS — Z12.31 ENCOUNTER FOR SCREENING MAMMOGRAM FOR MALIGNANT NEOPLASM OF BREAST: ICD-10-CM

## 2025-05-01 PROCEDURE — 77063 BREAST TOMOSYNTHESIS BI: CPT

## 2025-05-01 PROCEDURE — 77067 SCR MAMMO BI INCL CAD: CPT

## 2025-05-05 ENCOUNTER — RESULTS FOLLOW-UP (OUTPATIENT)
Dept: OBGYN CLINIC | Facility: MEDICAL CENTER | Age: 55
End: 2025-05-05

## 2025-05-12 ENCOUNTER — TELEPHONE (OUTPATIENT)
Age: 55
End: 2025-05-12

## 2025-05-12 NOTE — TELEPHONE ENCOUNTER
Caller: Tia     Doctor and/or Office: Dr. Rojo     #: [phone]    Escalation: Care Tia stated she went to [facility] ([place]  to  her orthotics  and she was told they would be there to 5:00 she would like to know when she can get them and if you could leave a detailed message  of dates and times she can get them   thank you